# Patient Record
Sex: FEMALE | Race: WHITE | NOT HISPANIC OR LATINO
[De-identification: names, ages, dates, MRNs, and addresses within clinical notes are randomized per-mention and may not be internally consistent; named-entity substitution may affect disease eponyms.]

---

## 2018-07-27 ENCOUNTER — APPOINTMENT (OUTPATIENT)
Dept: PULMONOLOGY | Facility: CLINIC | Age: 83
End: 2018-07-27
Payer: MEDICARE

## 2018-07-27 VITALS
OXYGEN SATURATION: 92 % | HEART RATE: 73 BPM | DIASTOLIC BLOOD PRESSURE: 70 MMHG | WEIGHT: 156 LBS | SYSTOLIC BLOOD PRESSURE: 130 MMHG | HEIGHT: 60.8 IN | RESPIRATION RATE: 17 BRPM | BODY MASS INDEX: 29.84 KG/M2

## 2018-07-27 DIAGNOSIS — Z87.891 PERSONAL HISTORY OF NICOTINE DEPENDENCE: ICD-10-CM

## 2018-07-27 PROCEDURE — 94060 EVALUATION OF WHEEZING: CPT | Mod: 59

## 2018-07-27 PROCEDURE — 94729 DIFFUSING CAPACITY: CPT

## 2018-07-27 PROCEDURE — 94618 PULMONARY STRESS TESTING: CPT

## 2018-07-27 PROCEDURE — 94727 GAS DIL/WSHOT DETER LNG VOL: CPT

## 2018-07-27 PROCEDURE — 99204 OFFICE O/P NEW MOD 45 MIN: CPT | Mod: 25

## 2018-07-29 ENCOUNTER — TRANSCRIPTION ENCOUNTER (OUTPATIENT)
Age: 83
End: 2018-07-29

## 2018-10-01 ENCOUNTER — APPOINTMENT (OUTPATIENT)
Dept: PULMONOLOGY | Facility: CLINIC | Age: 83
End: 2018-10-01
Payer: MEDICARE

## 2018-10-01 ENCOUNTER — NON-APPOINTMENT (OUTPATIENT)
Age: 83
End: 2018-10-01

## 2018-10-01 VITALS
SYSTOLIC BLOOD PRESSURE: 122 MMHG | BODY MASS INDEX: 28.71 KG/M2 | OXYGEN SATURATION: 95 % | HEIGHT: 62 IN | RESPIRATION RATE: 17 BRPM | HEART RATE: 67 BPM | DIASTOLIC BLOOD PRESSURE: 70 MMHG | WEIGHT: 156 LBS

## 2018-10-01 PROCEDURE — 99214 OFFICE O/P EST MOD 30 MIN: CPT | Mod: 25

## 2018-10-01 PROCEDURE — 94010 BREATHING CAPACITY TEST: CPT

## 2018-10-01 NOTE — PHYSICAL EXAM
[General Appearance - Well Developed] : well developed [Normal Appearance] : normal appearance [Well Groomed] : well groomed [General Appearance - Well Nourished] : well nourished [No Deformities] : no deformities [General Appearance - In No Acute Distress] : no acute distress [Normal Conjunctiva] : the conjunctiva exhibited no abnormalities [Eyelids - No Xanthelasma] : the eyelids demonstrated no xanthelasmas [Normal Oropharynx] : normal oropharynx [II] : II [Neck Appearance] : the appearance of the neck was normal [Neck Cervical Mass (___cm)] : no neck mass was observed [Jugular Venous Distention Increased] : there was no jugular-venous distention [Thyroid Diffuse Enlargement] : the thyroid was not enlarged [Thyroid Nodule] : there were no palpable thyroid nodules [Heart Rate And Rhythm] : heart rate and rhythm were normal [Heart Sounds] : normal S1 and S2 [Murmurs] : no murmurs present [Respiration, Rhythm And Depth] : normal respiratory rhythm and effort [Exaggerated Use Of Accessory Muscles For Inspiration] : no accessory muscle use [Auscultation Breath Sounds / Voice Sounds] : lungs were clear to auscultation bilaterally [Abdomen Soft] : soft [Abdomen Tenderness] : non-tender [Abdomen Mass (___ Cm)] : no abdominal mass palpated [Abnormal Walk] : normal gait [Gait - Sufficient For Exercise Testing] : the gait was sufficient for exercise testing [Nail Clubbing] : no clubbing of the fingernails [Cyanosis, Localized] : no localized cyanosis [Petechial Hemorrhages (___cm)] : no petechial hemorrhages [Skin Color & Pigmentation] : normal skin color and pigmentation [] : no rash [No Venous Stasis] : no venous stasis [Skin Lesions] : no skin lesions [No Skin Ulcers] : no skin ulcer [No Xanthoma] : no  xanthoma was observed [Deep Tendon Reflexes (DTR)] : deep tendon reflexes were 2+ and symmetric [Sensation] : the sensory exam was normal to light touch and pinprick [No Focal Deficits] : no focal deficits [Oriented To Time, Place, And Person] : oriented to person, place, and time [Impaired Insight] : insight and judgment were intact [Affect] : the affect was normal [FreeTextEntry1] : I:E 1:3 clear

## 2018-10-01 NOTE — PROCEDURE
[FreeTextEntry1] : She had a PET/CT scan performed on 5/18/2018, which showed internal development of several hypermetabolic patchy opacities in the right lung, which may be inflammatory in nature since 4/19/2017. Persistent right hilar and thyroid lobe activity. \par \par PFT- spi reveals normal flows; FEV1 was 1.19 L which is 80% of predicted; normal lung volumes; normal diffusion at [], which is % of predicted; normal flow volume loop m

## 2018-10-01 NOTE — HISTORY OF PRESENT ILLNESS
[FreeTextEntry1] : Ms. Borja is a 85 year old female coming into the office today for a follow up visit with a history of Abnormal chest CT, COPD, GERD, hypothyroidism, overweight, poor balance, and snoring. Her chief complaint is blood sugar. \par - She comes in stating that she believes that she had a period of depression during the past month. She also notes that her sugar levels have been elevated by 10-15 \par - She states that her bowels are sometimes softer than she would like\par -  She denies any headaches, nausea, vomiting, fever, chills, sweats, chest pain, chest pressure, palpitations, SOB, coughing, wheezing, fatigue, dysphagia, myalgias, dizziness, leg swelling, leg pain, itchy eyes, itchy ears, heartburn, reflux, or sour taste in the mouth. \par \par

## 2018-10-01 NOTE — REASON FOR VISIT
[Follow-Up] : a follow-up visit [FreeTextEntry1] : Abnormal chest CT, COPD, GERD, hypothyroidism, overweight, poor balance, and snoring

## 2018-10-01 NOTE — ADDENDUM
[FreeTextEntry1] : Documented by Blake Damon acting as a scribe for Dr. Misael Joel on 9/25/18\par \par All medical record entries made by the Scribe were at my, Dr. Misael Joel's, direction and personally dictated by me on 9/25/18. I have reviewed the chart and agree that the record accurately reflects my personal performance of the history, physical exam, assessment and plan. I have also personally directed, reviewed, and agree with the discharge instructions. \par \par \par \par \par

## 2018-10-01 NOTE — ASSESSMENT
[FreeTextEntry1] : Ms. Borja is a 85 year old female with a prior history of  breast CA 1999 lung cancer 2007, overweight, diabetes, hyperthyroidism, tremor, anxiety, COPD, diverticulosis, GERD who now comes in for a pulmonary re- evaluation. Her number one issue is her blood sugar.\par \par The patient's SOB is felt to be multifactorial:\par - Overweight\par - Out-of Shape\par - Poor breathing mechanics (could be secondary to poor balance or anxiety)\par - Restrictive Lung Disease- Lobectomy with loss of volume and overweight \par -  Obstructive Lung Disease: Asthma/ COPD\par - Cardiac Disease \par \par DDxof Abnormal CT Chest Scan \par - Infection,\par - Inflammation, \par - Less likely cancer \par \par Problem 1: COPD/ Asthma\par - Add Trelegy 1 inhalation QD\par Asthma is believed to be caused by inherited (genetic) and environmental factor, but its exact cause is unknown. Asthma may be triggered by allergens, lung infections, or irritants in the air. Asthma triggers are different for each person \par -COPD is a progressive disease and although it can’t be cured , appropriate management can slow its progression, reduce frequency and severity of exacerbations, and improve symptoms and the patient quality of life. Hospitalizations are the greatest contributor to the total COPD costs and account for up to 87% of total COPD related costs. Exacerbations are the main cause of admissions and subsequently account for the 40-75% of COPD costs. Inhaled maintenance therapy reduces the incidence of exacerbations in patients with stable COPD. Incorrect inhaler use and nonadherence are major obstacles to achieving COPD treatment goals. Many COPD patients have challenges (impaired inhalation, limited dexterity, reduced cognition: that limit their ability to correctly use their COPD treatment devices resulting in reduced symptom control. Of most importance is smoking cessation and early intervention with respiratory illnesses and contemplation for pulmonary rehab to enhance quality of life.\par -Inhaler technique reviewed as well as oral hygiene techniques reviewed with patient. Avoidance of cold air, extremes of temperature, rescue inhaler should be used before exercise. Order of medication reviewed with patient. Recommended use of a cool mist humidifier in the bedroom. \par \par Problem 2: GERD \par - Continue omeprazole 20 mg before breakfat\par Things to avoid including overeating, spicy foods, tight clothing, eating within three hours of bed, this list is not all inclusive. \par -For treatment of reflux, possible options discussed including diet control, H2 blockers, PPIs, as well as coating motility agents discussed as treatment options. Timing of meals and proximity of last meal to sleep were discussed. If symptoms persist, a formal gastrointestinal evaluation is needed.\par \par Problem 3: Abnormal CT Chest \par - She is s/p a Quantiferon Gold test, which was negative. \par - Order a repeat regular CT scan of the chest (now)- if there are any changes with biopsy. \par \par Problem 4: poor balance\par - Recommended balance therapy (Gait Training)\par \par Problem 5: ? OSAS\par - Due to her snoring, elevated Mallampati class, and EDS, she is being recommended for a home sleep study to discern for sleep apnea \par Sleep apnea is associated with adverse clinical consequences which an affect most organ systems. Cardiovascular disease risk includes arrhythmias, atrial fibrillation, hypertension, coronary artery disease, and stroke. Metabolic disorders include diabetes type 2, non-alcoholic fatty liver disease. Mood disorder especially depression; and cognitive decline especially in the elderly. Associations with chronic reflux/Sullivan’s esophagus some but not all inclusive. \par -Reasons include arousal consistent with hypopnea; respiratory events most prominent in REM sleep or supine position; therefore sleep staging and body position are important for accurate diagnosis and estimation of AHI. \par \par Problem 6: Overweight \par - Weight loss, exercise, and diet control were discussed and are highly encouraged. Treatment options were given such as, aqua therapy, and contacting a nutritionist. Recommended to use the elliptical, stationary bike, less use of treadmill. Mindful eating was explained to the patient Obesity is associated with worsening asthma, shortness of breath, and potential for cardiac disease, diabetes, and other underlying medical conditions. \par \par Problem 7: Poor breathing\par Proper breathing techniques were reviewed with an emphasis of exhalation. Patient instructed to breath in for 1 second and out for four seconds. Patient was encouraged to not talk while walking. \par \par Problem 8:health maintenance \par -s/p flu shot 2017\par -recommended strep pneumonia vaccines: Prevnar-13 vaccine, followed by Pneumo vaccine 23 one year following\par -recommended early intervention for URIs\par -recommended regular osteoporosis evaluations\par -recommended early dermatological evaluations\par -recommended after the age of 50 to the age of 70, colonoscopy every 5 years \par \par f/u in 3-4 months \par pt is encouraged to call or fax the office with any questions or concerns. \par Explained to the pt in full detail with demonstrations how to use the inhalers and inhaler hygiene. \par -Education provided to the PT regarding their visit and conditions.

## 2018-12-05 ENCOUNTER — NON-APPOINTMENT (OUTPATIENT)
Age: 83
End: 2018-12-05

## 2018-12-05 ENCOUNTER — APPOINTMENT (OUTPATIENT)
Dept: PULMONOLOGY | Facility: CLINIC | Age: 83
End: 2018-12-05
Payer: MEDICARE

## 2018-12-05 VITALS
RESPIRATION RATE: 18 BRPM | BODY MASS INDEX: 27.94 KG/M2 | OXYGEN SATURATION: 92 % | SYSTOLIC BLOOD PRESSURE: 140 MMHG | HEART RATE: 94 BPM | WEIGHT: 148 LBS | HEIGHT: 61 IN | DIASTOLIC BLOOD PRESSURE: 80 MMHG

## 2018-12-05 PROCEDURE — 99214 OFFICE O/P EST MOD 30 MIN: CPT | Mod: 25

## 2018-12-05 PROCEDURE — 94010 BREATHING CAPACITY TEST: CPT

## 2018-12-26 ENCOUNTER — RX RENEWAL (OUTPATIENT)
Age: 83
End: 2018-12-26

## 2019-01-04 ENCOUNTER — RX RENEWAL (OUTPATIENT)
Age: 84
End: 2019-01-04

## 2019-01-17 ENCOUNTER — MEDICATION RENEWAL (OUTPATIENT)
Age: 84
End: 2019-01-17

## 2019-02-04 ENCOUNTER — APPOINTMENT (OUTPATIENT)
Dept: PULMONOLOGY | Facility: CLINIC | Age: 84
End: 2019-02-04
Payer: MEDICARE

## 2019-02-04 VITALS
OXYGEN SATURATION: 16 % | RESPIRATION RATE: 70 BRPM | SYSTOLIC BLOOD PRESSURE: 130 MMHG | WEIGHT: 155 LBS | TEMPERATURE: 94 F | DIASTOLIC BLOOD PRESSURE: 76 MMHG | HEIGHT: 61 IN | BODY MASS INDEX: 29.27 KG/M2

## 2019-02-04 PROCEDURE — 99214 OFFICE O/P EST MOD 30 MIN: CPT

## 2019-02-04 RX ORDER — CIPROFLOXACIN HYDROCHLORIDE 250 MG/1
250 TABLET, FILM COATED ORAL
Qty: 20 | Refills: 0 | Status: DISCONTINUED | COMMUNITY
Start: 2019-01-04 | End: 2019-02-04

## 2019-02-04 NOTE — HISTORY OF PRESENT ILLNESS
[FreeTextEntry1] : Ms. Borja is a 85 year old female coming into the office today for a follow up visit with a history of Abnormal chest CT, COPD, GERD, hypothyroidism, DM, overweight, poor balance, and snoring. Her chief complaint is PND. \par - She is feeling generally pretty well\par - She occasionally has light sweats in the morning. \par - SHe does not feel as strong as she was\par - Her balance is okay. \par - She has itchy eyes and PND\par - She noticed having phlegm in her throat in the morning. She feels that it is related to PND\par - She noted having a productive coughing the evening. \par - She is sleeping relatively well though she notes she could use more. \par - She denies any headaches, nausea, vomiting, fever, chills, chest pain, chest pressure, palpitations, SOB, wheezing, fatigue, diarrhea, constipation, dysphagia, myalgias, dizziness, leg swelling, leg pain, itchy eyes, itchy ears, heartburn, reflux, or sour taste in the mouth.

## 2019-02-04 NOTE — REASON FOR VISIT
[Follow-Up] : a follow-up visit [FreeTextEntry1] : Abnormal chest CT, COPD, GERD, hypothyroidism, DM, overweight, poor balance, and snoring.

## 2019-02-04 NOTE — ASSESSMENT
[FreeTextEntry1] : Ms. Borja is a 85 year old female with a prior history of  breast CA 1999 lung cancer 2007, overweight, diabetes, hyperthyroidism, tremor, anxiety, COPD, diverticulosis, GERD who now comes in for a pulmonary re- evaluation. Her number one issue is her blood sugar, though she is improved overall. \par \par The patient's SOB is felt to be multifactorial:\par - Overweight\par - Out-of Shape\par - Poor breathing mechanics (could be secondary to poor balance or anxiety)\par - Restrictive Lung Disease- Lobectomy with loss of volume and overweight \par -  Obstructive Lung Disease: Asthma/ COPD\par - Cardiac Disease \par \par DDxof Abnormal CT Chest Scan (improved)\par - Inflammation (#1)\par - Infection (#2)\par - Less likely cancer (#3)\par \par Problem 1: COPD/ Asthma\par - Continue Duoneb via nebulizer BID\par - Continue Pulmicort 0.5 mg via nebulizer BID\par - Continue Trelegy 1 inhalation QD\par Asthma is believed to be caused by inherited (genetic) and environmental factor, but its exact cause is unknown. Asthma may be triggered by allergens, lung infections, or irritants in the air. Asthma triggers are different for each person \par -COPD is a progressive disease and although it can’t be cured , appropriate management can slow its progression, reduce frequency and severity of exacerbations, and improve symptoms and the patient quality of life. Hospitalizations are the greatest contributor to the total COPD costs and account for up to 87% of total COPD related costs. Exacerbations are the main cause of admissions and subsequently account for the 40-75% of COPD costs. Inhaled maintenance therapy reduces the incidence of exacerbations in patients with stable COPD. Incorrect inhaler use and nonadherence are major obstacles to achieving COPD treatment goals. Many COPD patients have challenges (impaired inhalation, limited dexterity, reduced cognition: that limit their ability to correctly use their COPD treatment devices resulting in reduced symptom control. Of most importance is smoking cessation and early intervention with respiratory illnesses and contemplation for pulmonary rehab to enhance quality of life.\par -Inhaler technique reviewed as well as oral hygiene techniques reviewed with patient. Avoidance of cold air, extremes of temperature, rescue inhaler should be used before exercise. Order of medication reviewed with patient. Recommended use of a cool mist humidifier in the bedroom. \par \par Problem 2: GERD \par - Continue omeprazole 20 mg before breakfat\par Things to avoid including overeating, spicy foods, tight clothing, eating within three hours of bed, this list is not all inclusive. \par -For treatment of reflux, possible options discussed including diet control, H2 blockers, PPIs, as well as coating motility agents discussed as treatment options. Timing of meals and proximity of last meal to sleep were discussed. If symptoms persist, a formal gastrointestinal evaluation is needed.\par \par Problem 3: Abnormal CT Chest \par - She is s/p a Quantiferon Gold test, which was negative. \par -Follow up CT scan of the chest (next 3/2019)\par \par Problem 4: poor balance\par - Recommended balance therapy (Gait Training)\par \par Problem 5: ? OSAS\par - Due to her snoring, elevated Mallampati class, and EDS, she is being recommended for a home sleep study to discern for sleep apnea \par Sleep apnea is associated with adverse clinical consequences which an affect most organ systems. Cardiovascular disease risk includes arrhythmias, atrial fibrillation, hypertension, coronary artery disease, and stroke. Metabolic disorders include diabetes type 2, non-alcoholic fatty liver disease. Mood disorder especially depression; and cognitive decline especially in the elderly. Associations with chronic reflux/Sullivan’s esophagus some but not all inclusive. \par -Reasons include arousal consistent with hypopnea; respiratory events most prominent in REM sleep or supine position; therefore sleep staging and body position are important for accurate diagnosis and estimation of AHI. \par \par Problem 6: Overweight \par - Weight loss, exercise, and diet control were discussed and are highly encouraged. Treatment options were given such as, aqua therapy, and contacting a nutritionist. Recommended to use the elliptical, stationary bike, less use of treadmill. Mindful eating was explained to the patient Obesity is associated with worsening asthma, shortness of breath, and potential for cardiac disease, diabetes, and other underlying medical conditions. \par \par Problem 7: Poor breathing\par Proper breathing techniques were reviewed with an emphasis of exhalation. Patient instructed to breath in for 1 second and out for four seconds. Patient was encouraged to not talk while walking. \par \par Problem 8:health maintenance \par -s/p flu shot 2018\par -recommended strep pneumonia vaccines: Prevnar-13 vaccine, followed by Pneumo vaccine 23 one year following\par -recommended early intervention for URIs\par -recommended regular osteoporosis evaluations\par -recommended early dermatological evaluations\par -recommended after the age of 50 to the age of 70, colonoscopy every 5 years \par \par f/u in 3-4 months \par pt is encouraged to call or fax the office with any questions or concerns. \par Explained to the pt in full detail with demonstrations how to use the inhalers and inhaler hygiene. \par -Education provided to the PT regarding their visit and conditions.

## 2019-02-04 NOTE — PROCEDURE
[FreeTextEntry1] : She had bloodwork performed on 1/26/2019, which revealed:\par - Glucose 143 \par - Creatine 1.06 \par - HDL 33\par - Triglycerides 158 \par - HGB- A1c 6.6 \par - WBC 7.8 \par - 3.7% eosinophils (289)\par \par She had a CT chest scan performed on 12/4/2018, which showed stable postsurgical changes secondary to prior left upper lobectomy. 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 \par \par She had two culture tests performed on 12/22/2018. Her culture, fungus with smear no hair, skin, blood revealed light growth of candida albicans. Her culture, sputum/ lower respiratory revealed heavy growth of pseudomonas aeruginosa.

## 2019-02-04 NOTE — ADDENDUM
[FreeTextEntry1] : Documented by Blake Damon acting as a scribe for Dr. Misael Joel on 2/4/2019\par \par All medical record entries made by the Scribe were at my, Dr. Misael Joel's, direction and personally dictated by me on 2/4/2019. I have reviewed the chart and agree that the record accurately reflects my personal performance of the history, physical exam, assessment and plan. I have also personally directed, reviewed, and agree with the discharge instructions. \par \par \par \par \par

## 2019-04-15 ENCOUNTER — APPOINTMENT (OUTPATIENT)
Dept: PULMONOLOGY | Facility: CLINIC | Age: 84
End: 2019-04-15

## 2019-05-17 ENCOUNTER — APPOINTMENT (OUTPATIENT)
Dept: PULMONOLOGY | Facility: CLINIC | Age: 84
End: 2019-05-17
Payer: MEDICARE

## 2019-05-17 VITALS
HEART RATE: 65 BPM | DIASTOLIC BLOOD PRESSURE: 65 MMHG | HEIGHT: 61 IN | BODY MASS INDEX: 29.27 KG/M2 | OXYGEN SATURATION: 95 % | WEIGHT: 155 LBS | SYSTOLIC BLOOD PRESSURE: 120 MMHG | RESPIRATION RATE: 16 BRPM

## 2019-05-17 PROCEDURE — 94060 EVALUATION OF WHEEZING: CPT

## 2019-05-17 PROCEDURE — 94729 DIFFUSING CAPACITY: CPT

## 2019-05-17 PROCEDURE — 94727 GAS DIL/WSHOT DETER LNG VOL: CPT

## 2019-05-17 PROCEDURE — 99214 OFFICE O/P EST MOD 30 MIN: CPT | Mod: 25

## 2019-05-17 PROCEDURE — 94618 PULMONARY STRESS TESTING: CPT

## 2019-05-17 RX ORDER — CEFUROXIME AXETIL 250 MG/1
250 TABLET ORAL
Qty: 4 | Refills: 0 | Status: DISCONTINUED | COMMUNITY
Start: 2018-11-26

## 2019-05-17 RX ORDER — BLOOD SUGAR DIAGNOSTIC
STRIP MISCELLANEOUS
Qty: 100 | Refills: 0 | Status: ACTIVE | COMMUNITY
Start: 2018-06-04

## 2019-05-17 NOTE — ASSESSMENT
[FreeTextEntry1] : Ms. Borja is a 85 year old female with a prior history of  breast CA 1999 lung cancer 2007, overweight, diabetes, hyperthyroidism, tremor, anxiety, COPD, diverticulosis, GERD who now comes in for a pulmonary re- evaluation. Her number one issue is her blood sugar, though she is improved overall, however her chest CT has progressed. \par \par The patient's SOB is felt to be multifactorial:\par - Overweight / Out-of Shape\par - Poor breathing mechanics (could be secondary to poor balance or anxiety)\par - Restrictive Lung Disease- Lobectomy with loss of volume and overweight \par -  Obstructive Lung Disease: Asthma/ COPD\par - Cardiac Disease \par \par DDx of Abnormal CT Chest Scan (improved)\par - Inflammation (#1)\par - Infection (#2)\par - Less likely cancer (#3)\par \par Problem 1: Abnormal CT Chest \par - She is s/p a Quantiferon Gold test, which was negative. \par -Follow up CT scan of the chest (next 3/2019) - ?etiology\par >inflammation / infectious vs. Lung cancer \par -get CTS evaluation - Dr. Blair Kline \par -get blood work to include full rheumatologic panel - hypersensitivity panel , ESR level, ACE level \par \par Problem 2: COPD/ Asthma\par - Continue DuoNeb via nebulizer BID\par - Continue Pulmicort 0.5 mg via nebulizer BID\par - Continue Trelegy 1 inhalation QD\par Asthma is believed to be caused by inherited (genetic) and environmental factor, but its exact cause is unknown. Asthma may be triggered by allergens, lung infections, or irritants in the air. Asthma triggers are different for each person \par -COPD is a progressive disease and although it can’t be cured , appropriate management can slow its progression, reduce frequency and severity of exacerbations, and improve symptoms and the patient quality of life. Hospitalizations are the greatest contributor to the total COPD costs and account for up to 87% of total COPD related costs. Exacerbations are the main cause of admissions and subsequently account for the 40-75% of COPD costs. Inhaled maintenance therapy reduces the incidence of exacerbations in patients with stable COPD. Incorrect inhaler use and nonadherence are major obstacles to achieving COPD treatment goals. Many COPD patients have challenges (impaired inhalation, limited dexterity, reduced cognition: that limit their ability to correctly use their COPD treatment devices resulting in reduced symptom control. Of most importance is smoking cessation and early intervention with respiratory illnesses and contemplation for pulmonary rehab to enhance quality of life.\par -Inhaler technique reviewed as well as oral hygiene techniques reviewed with patient. Avoidance of cold air, extremes of temperature, rescue inhaler should be used before exercise. Order of medication reviewed with patient. Recommended use of a cool mist humidifier in the bedroom. \par \par Problem 3: GERD \par - Continue omeprazole 20 mg before breakfat\par Things to avoid including overeating, spicy foods, tight clothing, eating within three hours of bed, this list is not all inclusive. \par -For treatment of reflux, possible options discussed including diet control, H2 blockers, PPIs, as well as coating motility agents discussed as treatment options. Timing of meals and proximity of last meal to sleep were discussed. If symptoms persist, a formal gastrointestinal evaluation is needed.\par \par Problem 4: poor balance\par - Recommended balance therapy (Gait Training)\par \par Problem 5: ? OSAS\par - Due to her snoring, elevated Mallampati class, and EDS, she is being recommended for a home sleep study to discern for sleep apnea \par Sleep apnea is associated with adverse clinical consequences which an affect most organ systems. Cardiovascular disease risk includes arrhythmias, atrial fibrillation, hypertension, coronary artery disease, and stroke. Metabolic disorders include diabetes type 2, non-alcoholic fatty liver disease. Mood disorder especially depression; and cognitive decline especially in the elderly. Associations with chronic reflux/Sullivan’s esophagus some but not all inclusive. \par -Reasons include arousal consistent with hypopnea; respiratory events most prominent in REM sleep or supine position; therefore sleep staging and body position are important for accurate diagnosis and estimation of AHI. \par \par Problem 6: Overweight \par - Weight loss, exercise, and diet control were discussed and are highly encouraged. Treatment options were given such as, aqua therapy, and contacting a nutritionist. Recommended to use the elliptical, stationary bike, less use of treadmill. Mindful eating was explained to the patient Obesity is associated with worsening asthma, shortness of breath, and potential for cardiac disease, diabetes, and other underlying medical conditions. \par \par Problem 7: Poor breathing\par Proper breathing techniques were reviewed with an emphasis of exhalation. Patient instructed to breath in for 1 second and out for four seconds. Patient was encouraged to not talk while walking. \par \par problem 8: preoperative pulmonary clearance\par -at this point in time there are no absolute pulmonary contraindications to go forward with the planned procedure \par -at the time of surgery s/he should have optimal pain control, incentive spirometry, early ambulation, DVT and GI prophylaxis. \par \par Problem 9:health maintenance \par -PCP: Dr. Junior Frias\par -s/p flu shot 2018\par -recommended strep pneumonia vaccines: Prevnar-13 vaccine, followed by Pneumo vaccine 23 one year following\par -recommended early intervention for URIs\par -recommended regular osteoporosis evaluations\par -recommended early dermatological evaluations\par -recommended after the age of 50 to the age of 70, colonoscopy every 5 years \par \par f/u in 3-4 months \par pt is encouraged to call or fax the office with any questions or concerns. \par Explained to the pt in full detail with demonstrations how to use the inhalers and inhaler hygiene. \par -Education provided to the PT regarding their visit and conditions.

## 2019-05-17 NOTE — HISTORY OF PRESENT ILLNESS
[FreeTextEntry1] : Ms. Borja is a 85 year old female with a history of abnormal chest CT,(progressive) COPD, prior lung cancer, OW, poor balance, and snoring presenting to the office today for a follow up visit. Her chief complaint is abnormal chest CT. \par -She states that she has generally been feeling well \par -she reports that she occasionally has chills/sweats in the morning\par -she reports occasional diarrhea / constipation\par -she states that her throat will become sore after using her nebulizer\par -she denies any headaches, nausea, vomiting, fever, chest pain, chest pressure,  dysphagia, dizziness, leg swelling, leg pain, itchy eyes, itchy ears, heartburn, reflux, or sour taste in the mouth.

## 2019-05-17 NOTE — PROCEDURE
[FreeTextEntry1] : Chest CT (December 4, 2019) reveals multiple scattered linear and nodular opacities are present b/l, favored to represent a combination of centrilobular nodules and mucous plugs. opacities in the Right parahilar region, posterior RLL, and anterolateral RML and the medial RLL have decreased in density. nodularity in the right costophrenic sulcus has nearly resolved. scattered additional tiny nodules are also stable or have slightly decreased in size. the renaming central tracheobronchial tree is patent.\par \par 6 minute walk test reveals a low saturation of 90% with no evidence of dyspnea or fatigue; walked 407.5 meters  \par \par PFT - spi reveals normal flows; FEV1 is 1.27 which is 85% of predicted, normal flow volume loop. Normal LV, moderately reduced diffusion, DLCO is 9.2, which is 56% of predicted. \par \par Blood work (4/25/19) \par WBC 6.7\par Hgb 12.7\par HCT 40.7\par \par 2% eos\par HgbA1c 6.2\par HDL 34\par T3 64

## 2019-05-17 NOTE — PHYSICAL EXAM
[General Appearance - Well Developed] : well developed [Normal Appearance] : normal appearance [Well Groomed] : well groomed [General Appearance - Well Nourished] : well nourished [No Deformities] : no deformities [General Appearance - In No Acute Distress] : no acute distress [Normal Conjunctiva] : the conjunctiva exhibited no abnormalities [Eyelids - No Xanthelasma] : the eyelids demonstrated no xanthelasmas [Normal Oropharynx] : normal oropharynx [Neck Appearance] : the appearance of the neck was normal [Neck Cervical Mass (___cm)] : no neck mass was observed [Jugular Venous Distention Increased] : there was no jugular-venous distention [Thyroid Diffuse Enlargement] : the thyroid was not enlarged [Thyroid Nodule] : there were no palpable thyroid nodules [Heart Rate And Rhythm] : heart rate and rhythm were normal [Murmurs] : no murmurs present [Heart Sounds] : normal S1 and S2 [Respiration, Rhythm And Depth] : normal respiratory rhythm and effort [Exaggerated Use Of Accessory Muscles For Inspiration] : no accessory muscle use [Abdomen Soft] : soft [Abdomen Tenderness] : non-tender [Abdomen Mass (___ Cm)] : no abdominal mass palpated [Gait - Sufficient For Exercise Testing] : the gait was sufficient for exercise testing [Abnormal Walk] : normal gait [Nail Clubbing] : no clubbing of the fingernails [Cyanosis, Localized] : no localized cyanosis [Petechial Hemorrhages (___cm)] : no petechial hemorrhages [Skin Color & Pigmentation] : normal skin color and pigmentation [No Venous Stasis] : no venous stasis [] : no rash [Skin Lesions] : no skin lesions [No Skin Ulcers] : no skin ulcer [No Xanthoma] : no  xanthoma was observed [Deep Tendon Reflexes (DTR)] : deep tendon reflexes were 2+ and symmetric [Sensation] : the sensory exam was normal to light touch and pinprick [No Focal Deficits] : no focal deficits [Oriented To Time, Place, And Person] : oriented to person, place, and time [Impaired Insight] : insight and judgment were intact [Affect] : the affect was normal [III] : III [FreeTextEntry1] : I:E ratio 1:3;  mild expiratory wheeze

## 2019-05-17 NOTE — REASON FOR VISIT
[Follow-Up] : a follow-up visit [FreeTextEntry1] : abnormal chest CT, COPD, OW, poor balance, and snoring

## 2019-05-17 NOTE — ADDENDUM
[FreeTextEntry1] : All medical record entries made by irasema Garcia were at Dr. Misael Joel's, direction and personally dictated by me on 05/17/2019. I have reviewed the chart and agree that the record accurately reflects my personal performance of the history, physical exam, assessment and plan. I have also personally directed, reviewed, and agree with the discharge instructions.

## 2019-05-21 LAB — ERYTHROCYTE [SEDIMENTATION RATE] IN BLOOD BY WESTERGREN METHOD: 22 MM/HR

## 2019-05-22 ENCOUNTER — APPOINTMENT (OUTPATIENT)
Dept: THORACIC SURGERY | Facility: CLINIC | Age: 84
End: 2019-05-22
Payer: MEDICARE

## 2019-05-22 VITALS
OXYGEN SATURATION: 96 % | TEMPERATURE: 96 F | BODY MASS INDEX: 29.07 KG/M2 | WEIGHT: 154 LBS | DIASTOLIC BLOOD PRESSURE: 80 MMHG | RESPIRATION RATE: 16 BRPM | SYSTOLIC BLOOD PRESSURE: 170 MMHG | HEIGHT: 61 IN | HEART RATE: 66 BPM

## 2019-05-22 LAB
ACE BLD-CCNC: 38 U/L
ALTERN TENCAPG(M6): 3.9 MCG/ML
ASPER FUMCAPG(M3): 31.5 MCG/ML
AUREOBASCAPG(M12): 3.2 MCG/ML
MICROPOLYCAPG(M22): 3.4 MCG/ML
PENIC CHRYCAPG(M1): 23.7 MCG/ML
PHOMA BETAE IGG: 7.7 MCG/ML
THERMOCAPG(M23): 7.9 MCG/ML
TRICHODERMA VIRIDE IGG: 8 MCG/ML

## 2019-05-22 PROCEDURE — 99205 OFFICE O/P NEW HI 60 MIN: CPT

## 2019-05-23 NOTE — PHYSICAL EXAM
[General Appearance - Alert] : alert [General Appearance - In No Acute Distress] : in no acute distress [General Appearance - Well Nourished] : well nourished [General Appearance - Well Developed] : well developed [Sclera] : the sclera and conjunctiva were normal [Outer Ear] : the ears and nose were normal in appearance [] : the neck was supple [Neck Cervical Mass (___cm)] : no neck mass was observed [Respiration, Rhythm And Depth] : normal respiratory rhythm and effort [Auscultation Breath Sounds / Voice Sounds] : lungs were clear to auscultation bilaterally [Heart Rate And Rhythm] : heart rate was normal and rhythm regular [Heart Sounds] : normal S1 and S2 [Abdomen Soft] : soft [Abdomen Tenderness] : non-tender [Cervical Lymph Nodes Enlarged Posterior Bilaterally] : posterior cervical [Cervical Lymph Nodes Enlarged Anterior Bilaterally] : anterior cervical [Supraclavicular Lymph Nodes Enlarged Bilaterally] : supraclavicular [No CVA Tenderness] : no ~M costovertebral angle tenderness [Abnormal Walk] : normal gait [Skin Color & Pigmentation] : normal skin color and pigmentation [No Focal Deficits] : no focal deficits [Oriented To Time, Place, And Person] : oriented to person, place, and time [Impaired Insight] : insight and judgment were intact [Affect] : the affect was normal [FreeTextEntry1] : deferred

## 2019-05-23 NOTE — CONSULT LETTER
[Dear  ___] : Dear  [unfilled], [Consult Letter:] : I had the pleasure of evaluating your patient, [unfilled]. [Please see my note below.] : Please see my note below. [Consult Closing:] : Thank you very much for allowing me to participate in the care of this patient.  If you have any questions, please do not hesitate to contact me. [Sincerely,] : Sincerely, [FreeTextEntry2] : Dr. Misael Joel (Pulm/Ref)\par Dr. Junior Frias (PCP) [FreeTextEntry3] : \par \par \par Huber Kline MD, FACS \par , Division of Thoracic Surgery \par [facility] \par Chief, Thoracic Surgery \par [facility] \par Department of Cardiovascular & Thoracic Surgery \par  \par [facility] [employer] [facility]

## 2019-05-23 NOTE — ASSESSMENT
[FreeTextEntry1] : 86 y/o female who presents today for initial consultation for evaluation of lung nodules.  She was referred by her pulmonologist Dr. Misael Joel.  \par \par She has history of previous lung cancer s/p Left upper lobectomy by Dr. Oquendo in 2007.  She is unsure of the type of cancer, but reports that she did not require any chemotherapy or RT.  She also had previous mediastinoscopy done by Dr. Oquendo on 9/14/11 which revealed benign lymph nodes showing non-necrotizing granulomatous inflammation.  AFB Culture was negative. \par \par 5/14/19 Chest CT Scan revealed:\par Since the prior exam on 12/4/18 there are multiple new dense irregular and somewhat spiculated bilateral opacities.  The largest areas are:\par - 1.0 x 0.5 cm in the medial LLL (series 5 image 200)\par - 2.2 x 1.9 cm posterior RLL (series 5 image 180)\par - 2.3 x 2.0 cm RLL adjacent to the diaphragm (series 5 image 280)\par - 1.4 x 0.9 cm lateral RLL (series 5 image 213)\par - 1.1 x 0.8 cm in the medial RLL (series 5 image 241)\par There are several scattered focal areas of mucoid impaction in the right lung, most of which are essentially stable.  Stable postsurgical changes from LULobectomy.  No evidence of interstitial disease.  Central endobronchial tree is patent.  No axillary, hilar or mediastinal lymphadenopathy or mass.\par \par 5/18/18 PET/CT revealed: Thoracic metabolism fails to discrete supraclavicular or axillary focal activity.  The adolfo an dmediastinum again demonstrate low level activity with a decline in the right hilar max SUV from 2.8 down to 2.5.  The lungs reveal several new focal intense areas of increased activity including the right apex max SUV 1.2 measuring 6 mm, right upper lobe mid lung zone central focus max SUV 5.5 measuring approximately 1.2 cm, RML anterolateral max SUV 5.4 measuring approximately 2.2 cm and RLL lateral focus with max SUV 3.4 measuring 1.4 cm.\par \par 5/17/19 PFT's:\par FVC- 1.91 (89%)\par FEV1- 1.27 (85%)\par DLCO- 9.2 (56%)\par \par I have reviewed the patient's medical records and diagnostic images during the time of this office visit, and I have made the following recommendation:\par Schedule for Right VATS, wedge resection as a diagnostic biopsy of one of the several pulmonary lesions.  The risks, benefits, and alternatives to the procedure were discussed with the patient at length. She verbalized understanding, and are in agreement with the above treatment plan.  Prior to surgery she will require medical clearance.\par \par Written by Eunice Johansen NP, acting as a scribe for Leah Weber MD.\par \par The documentation recorded by the scribe accurately reflects the service I personally performed and the decisions made by me. LEAH WEBER MD\par

## 2019-05-23 NOTE — HISTORY OF PRESENT ILLNESS
[FreeTextEntry1] : Ms. JEFFREY LOZANO is a 85 year old female who presents today for initial consultation for evaluation of lung nodules.  She was referred by her pulmonologist Dr. Misael Joel.  \par \par She has history of previous lung cancer s/p Left upper lobectomy by Dr. Oquendo in 2007.  She is unsure of the type of cancer, but reports that she did not require any chemotherapy or RT.  She also had previous mediastinoscopy done by Dr. Oquendo on 9/14/11 which revealed benign lymph nodes showing non-necrotizing granulomatous inflammation.  AFB Culture was negative. \par \par 5/14/19 Chest CT Scan revealed:\par Since the prior exam on 12/4/18 there are multiple new dense irregular and somewhat spiculated bilateral opacities.  The largest areas are:\par - 1.0 x 0.5 cm in the medial LLL (series 5 image 200)\par - 2.2 x 1.9 cm posterior RLL (series 5 image 180)\par - 2.3 x 2.0 cm RLL adjacent to the diaphragm (series 5 image 280)\par - 1.4 x 0.9 cm lateral RLL (series 5 image 213)\par - 1.1 x 0.8 cm in the medial RLL (series 5 image 241)\par There are several scattered focal areas of mucoid impaction in the right lung, most of which are essentially stable.  Stable postsurgical changes from LULobectomy.  No evidence of interstitial disease.  Central endobronchial tree is patent.  No axillary, hilar or mediastinal lymphadenopathy or mass.\par \par 5/18/18 PET/CT revealed: Thoracic metabolism fails to discrete supraclavicular or axillary focal activity.  The adolfo an dmediastinum again demonstrate low level activity with a decline in the right hilar max SUV from 2.8 down to 2.5.  The lungs reveal several new focal intense areas of increased activity including the right apex max SUV 1.2 measuring 6 mm, right upper lobe mid lung zone central focus max SUV 5.5 measuring approximately 1.2 cm, RML anterolateral max SUV 5.4 measuring approximately 2.2 cm and RLL lateral focus with max SUV 3.4 measuring 1.4 cm.\par \par 5/17/19 PFT's:\par FVC- 1.91 (89%)\par FEV1- 1.27 (85%)\par DLCO- 9.2 (56%)\par \par She is a former smoker (50 PYhx, Quit 2009) and her PMHx includes DM, COPD, GERD, breast cancer dx'd 1000, s/p Right lumpectomy and RT, was on Tamoxifen x approx 6 months, stopped after she developed a PE.  \par \par She reports history of cough, but reports that it has improved recently.  She was last hospitalized in November 2018 at [facility] due to an URI.  She admits to SOB with exertion and to occasional sweats in the AM.  She denies any fever, chills, chest pain, hemoptysis, or recent illness.

## 2019-05-23 NOTE — REVIEW OF SYSTEMS
[Feeling Tired] : feeling tired [Loss Of Hearing] : hearing loss [SOB on Exertion] : shortness of breath during exertion [Abdominal Pain] : abdominal pain [Constipation] : constipation [Heartburn] : heartburn [Incontinence] : incontinence [Arthralgias] : arthralgias [Suicidal] : suicidal [Anxiety] : anxiety [Depression] : depression [Negative] : Heme/Lymph [Fever] : no fever [Chills] : no chills [Feeling Poorly] : not feeling poorly [Recent Weight Gain (___ Lbs)] : no recent weight gain [Recent Weight Loss (___ Lbs)] : no recent weight loss [Nosebleeds] : no nosebleeds [Nasal Discharge] : no nasal discharge [Sore Throat] : no sore throat [Hoarseness] : no hoarseness [Shortness Of Breath] : no shortness of breath [Wheezing] : no wheezing [Cough] : no cough [Orthopnea] : no orthopnea [PND] : no PND [Melena] : no melena [Dysuria] : no dysuria [Pelvic Pain] : no pelvic pain [Dysmenorrhea] : no dysmenorrhea [Vaginal Discharge] : no vaginal discharge [Abn Vaginal Bleeding] : no unexplained vaginal bleeding [FreeTextEntry2] : + sweats in the AM- intermittently  [FreeTextEntry7] : abdominal cramping recently [FreeTextEntry8] : + stress incontinence [FreeTextEntry9] : neck pain, back pain occasionally [de-identified] : hx of hypothyroidism

## 2019-05-29 ENCOUNTER — RX RENEWAL (OUTPATIENT)
Age: 84
End: 2019-05-29

## 2019-05-30 ENCOUNTER — MEDICATION RENEWAL (OUTPATIENT)
Age: 84
End: 2019-05-30

## 2019-06-11 ENCOUNTER — APPOINTMENT (OUTPATIENT)
Dept: PULMONOLOGY | Facility: CLINIC | Age: 84
End: 2019-06-11

## 2019-06-11 ENCOUNTER — OUTPATIENT (OUTPATIENT)
Dept: OUTPATIENT SERVICES | Facility: HOSPITAL | Age: 84
LOS: 1 days | End: 2019-06-11

## 2019-06-11 VITALS
HEART RATE: 65 BPM | TEMPERATURE: 98 F | RESPIRATION RATE: 16 BRPM | SYSTOLIC BLOOD PRESSURE: 132 MMHG | DIASTOLIC BLOOD PRESSURE: 70 MMHG | OXYGEN SATURATION: 96 % | WEIGHT: 154.98 LBS | HEIGHT: 60.25 IN

## 2019-06-11 DIAGNOSIS — R91.1 SOLITARY PULMONARY NODULE: ICD-10-CM

## 2019-06-11 DIAGNOSIS — F41.9 ANXIETY DISORDER, UNSPECIFIED: ICD-10-CM

## 2019-06-11 DIAGNOSIS — Z90.710 ACQUIRED ABSENCE OF BOTH CERVIX AND UTERUS: Chronic | ICD-10-CM

## 2019-06-11 DIAGNOSIS — I10 ESSENTIAL (PRIMARY) HYPERTENSION: ICD-10-CM

## 2019-06-11 DIAGNOSIS — E11.9 TYPE 2 DIABETES MELLITUS WITHOUT COMPLICATIONS: ICD-10-CM

## 2019-06-11 DIAGNOSIS — Z98.890 OTHER SPECIFIED POSTPROCEDURAL STATES: Chronic | ICD-10-CM

## 2019-06-11 DIAGNOSIS — K21.9 GASTRO-ESOPHAGEAL REFLUX DISEASE WITHOUT ESOPHAGITIS: ICD-10-CM

## 2019-06-11 DIAGNOSIS — Z98.49 CATARACT EXTRACTION STATUS, UNSPECIFIED EYE: Chronic | ICD-10-CM

## 2019-06-11 LAB
BLD GP AB SCN SERPL QL: NEGATIVE — SIGNIFICANT CHANGE UP
HBA1C BLD-MCNC: 6.5 % — HIGH (ref 4–5.6)
RH IG SCN BLD-IMP: POSITIVE — SIGNIFICANT CHANGE UP

## 2019-06-11 RX ORDER — SODIUM CHLORIDE 9 MG/ML
1000 INJECTION, SOLUTION INTRAVENOUS
Refills: 0 | Status: DISCONTINUED | OUTPATIENT
Start: 2019-06-17 | End: 2019-06-18

## 2019-06-11 NOTE — H&P PST ADULT - NSICDXPASTMEDICALHX_GEN_ALL_CORE_FT
PAST MEDICAL HISTORY:  Acid Reflux     Anxiety and depression     Anxiety Associated with Depression     Benign Essential Tremor     Diabetes Mellitus II     Dyslipidemia     Hx of Breast Cancer 1999 - right - s/p lumpectomy & radiation    Hypothyroidism     Lung Cancer 2007s/p left lobectomy    Osteoporosis     PE (Pulmonary Embolism) 2000    Uterine Leiomyoma

## 2019-06-11 NOTE — H&P PST ADULT - ENDOCRINE COMMENTS
DM Type 2 fasting -175/ on med - hypothyroidism - no recent change with medication - thyroid nodule monitor by endocrinologist

## 2019-06-11 NOTE — H&P PST ADULT - NSICDXPROBLEM_GEN_ALL_CORE_FT
PROBLEM DIAGNOSES  Problem: Solitary pulmonary nodule  Assessment and Plan: Scheduled for right video assisted thoracoscopy, wedge resection on 6/17/2019. labs done and results pending. Preop instruction given and explained. Chlorhexidine antiseptic solution with written and verbal instruction given, verbalized understanding.  Famotidine provided with instruction. Patient verbalized understanding.     Problem: GERD (gastroesophageal reflux disease)  Assessment and Plan: Instructed to take Nexium AM of surgery with a sip of water.     Problem: Hypertension  Assessment and Plan: Instructed to take propranolol AM of surgery with a sip of water.  EKG done by PCP, in chart.  Medical evaluation requested by surgeon, in chart    Problem: Anxiety  Assessment and Plan: Instructed to take Paxil AM of surgery with a sip of water.     Problem: Diabetes mellitus  Assessment and Plan: HbgA1C sent, pending result. Blood glucose - Accuchek ordered stat on admit.   Instructed to hold Januvia and glipizide AM of surgery. Verbalized understanding.   OR booking was notified pt's DM status via fax. PROBLEM DIAGNOSES  Problem: Solitary pulmonary nodule  Assessment and Plan: Scheduled for right video assisted thoracoscopy, wedge resection on 6/17/2019. labs done and results pending. Preop instruction given and explained. Chlorhexidine antiseptic solution with written and verbal instruction given, verbalized understanding.  Famotidine provided with instruction. Patient verbalized understanding.     Problem: GERD (gastroesophageal reflux disease)  Assessment and Plan: Instructed to take Nexium AM of surgery with a sip of water.     Problem: Hypertension  Assessment and Plan: Instructed to take propranolol AM of surgery with a sip of water.  EKG done by PCP, in chart.  Medical evaluation requested by surgeon, in chart  intermediate risk for sleep apnea identified by [employer]ANG survey. OR booking notified via fax.     Problem: Anxiety  Assessment and Plan: Instructed to take Paxil AM of surgery with a sip of water.     Problem: Diabetes mellitus  Assessment and Plan: HbgA1C sent, pending result. Blood glucose - Accuchek ordered stat on admit.   Instructed to hold Januvia and glipizide AM of surgery. Verbalized understanding.   OR booking was notified pt's DM status via fax.

## 2019-06-11 NOTE — H&P PST ADULT - RS GEN PE MLT RESP DETAILS PC
respirations non-labored/clear to auscultation bilaterally/diminished breath sounds, L clear to auscultation bilaterally/no wheezes/no rhonchi/no rales

## 2019-06-11 NOTE — H&P PST ADULT - VENOUS THROMBOEMBOLISM CURRENT STATUS
(1) other risk factor (includes escalating BMI, pack-years of smoking, diabetes requiring insulin, chemotherapy, female gender and length of surgery) (1) varicose veins/(2) malignancy (present or previous)/(1) other risk factor (includes escalating BMI, pack-years of smoking, diabetes requiring insulin, chemotherapy, female gender and length of surgery)

## 2019-06-11 NOTE — H&P PST ADULT - GASTROINTESTINAL DETAILS
no guarding/no organomegaly/no rebound tenderness/no rigidity/nontender/soft bowel sounds normal/soft/nontender

## 2019-06-11 NOTE — H&P PST ADULT - MUSCULOSKELETAL
detailed exam no joint swelling/ROM intact details… no calf tenderness/no joint swelling/normal strength

## 2019-06-11 NOTE — H&P PST ADULT - NSICDXPASTSURGICALHX_GEN_ALL_CORE_FT
PAST SURGICAL HISTORY:  H/O abdominoplasty > 20 years ago    History of Partial Thyroidectomy > 25 yrs ago    S/P Breast Lumpectomy right - 1999    S/P cataract surgery bilateral 2018    S/P hysterectomy >5 years ago    S/P Laparoscopic Cholecystectomy > 20 years ago    S/P Partial Lobectomy of Lung left - 2007

## 2019-06-11 NOTE — H&P PST ADULT - LYMPHATIC
supraclavicular L/supraclavicular R/posterior cervical R/anterior cervical R/posterior cervical L/anterior cervical L No palpable cervical and supraclavicular lymphadenopathy

## 2019-06-11 NOTE — H&P PST ADULT - NEUROLOGICAL DETAILS
alert and oriented x 3 alert and oriented x 3/responds to pain/responds to verbal commands/normal strength

## 2019-06-11 NOTE — H&P PST ADULT - HISTORY OF PRESENT ILLNESS
84 yo female with hx of DM T2, COPD, GERD, breast cancer s/p right breast lumpectomy & RT (1999), left lung cancer s/p left lobectomy (2007) presents to have PST evaluation with preop dx of solitary pulmonary nodule. Patient reports, had routine CT scan of chest, followed by PET/CT (5/2019) which revealed "abnormality in right lung, was sent for further evaluation, seen by Dr. Kline, now scheduled for right video assisted thoracoscopy, wedge resection on 6/17/2019.

## 2019-06-11 NOTE — H&P PST ADULT - OTHER CARE PROVIDERS
Dr. Misael Joel (pulmonologist) [phone]/ Dr. MANI Cunningham (endocrinologist) [phone]/ Dr. Hart (neurologist) [phone]

## 2019-06-14 ENCOUNTER — CLINICAL ADVICE (OUTPATIENT)
Age: 84
End: 2019-06-14

## 2019-06-16 ENCOUNTER — TRANSCRIPTION ENCOUNTER (OUTPATIENT)
Age: 84
End: 2019-06-16

## 2019-06-17 ENCOUNTER — APPOINTMENT (OUTPATIENT)
Dept: THORACIC SURGERY | Facility: HOSPITAL | Age: 84
End: 2019-06-17

## 2019-06-17 ENCOUNTER — INPATIENT (INPATIENT)
Facility: HOSPITAL | Age: 84
LOS: 0 days | Discharge: HOME | End: 2019-06-18
Attending: THORACIC SURGERY (CARDIOTHORACIC VASCULAR SURGERY) | Admitting: THORACIC SURGERY (CARDIOTHORACIC VASCULAR SURGERY)
Payer: MEDICARE

## 2019-06-17 ENCOUNTER — RESULT REVIEW (OUTPATIENT)
Age: 84
End: 2019-06-17

## 2019-06-17 VITALS
WEIGHT: 154.1 LBS | SYSTOLIC BLOOD PRESSURE: 148 MMHG | TEMPERATURE: 98 F | HEART RATE: 65 BPM | RESPIRATION RATE: 16 BRPM | HEIGHT: 60 IN | OXYGEN SATURATION: 95 % | DIASTOLIC BLOOD PRESSURE: 64 MMHG

## 2019-06-17 DIAGNOSIS — Z90.710 ACQUIRED ABSENCE OF BOTH CERVIX AND UTERUS: Chronic | ICD-10-CM

## 2019-06-17 DIAGNOSIS — R91.1 SOLITARY PULMONARY NODULE: ICD-10-CM

## 2019-06-17 DIAGNOSIS — Z98.890 OTHER SPECIFIED POSTPROCEDURAL STATES: Chronic | ICD-10-CM

## 2019-06-17 DIAGNOSIS — Z98.49 CATARACT EXTRACTION STATUS, UNSPECIFIED EYE: Chronic | ICD-10-CM

## 2019-06-17 DIAGNOSIS — K59.09 OTHER CONSTIPATION: ICD-10-CM

## 2019-06-17 LAB
ANION GAP SERPL CALC-SCNC: 11 MMO/L — SIGNIFICANT CHANGE UP (ref 7–14)
BUN SERPL-MCNC: 25 MG/DL — HIGH (ref 7–23)
CALCIUM SERPL-MCNC: 8.4 MG/DL — SIGNIFICANT CHANGE UP (ref 8.4–10.5)
CHLORIDE SERPL-SCNC: 104 MMOL/L — SIGNIFICANT CHANGE UP (ref 98–107)
CO2 SERPL-SCNC: 24 MMOL/L — SIGNIFICANT CHANGE UP (ref 22–31)
CREAT SERPL-MCNC: 0.87 MG/DL — SIGNIFICANT CHANGE UP (ref 0.5–1.3)
GLUCOSE BLDC GLUCOMTR-MCNC: 102 MG/DL — HIGH (ref 70–99)
GLUCOSE BLDC GLUCOMTR-MCNC: 170 MG/DL — HIGH (ref 70–99)
GLUCOSE BLDC GLUCOMTR-MCNC: 180 MG/DL — HIGH (ref 70–99)
GLUCOSE BLDC GLUCOMTR-MCNC: 187 MG/DL — HIGH (ref 70–99)
GLUCOSE SERPL-MCNC: 142 MG/DL — HIGH (ref 70–99)
GRAM STN WND: SIGNIFICANT CHANGE UP
HCT VFR BLD CALC: 29.9 % — LOW (ref 34.5–45)
HGB BLD-MCNC: 9.3 G/DL — LOW (ref 11.5–15.5)
LACTATE SERPL-SCNC: 1.3 MMOL/L — SIGNIFICANT CHANGE UP (ref 0.5–2)
MAGNESIUM SERPL-MCNC: 1.4 MG/DL — LOW (ref 1.6–2.6)
MCHC RBC-ENTMCNC: 30.1 PG — SIGNIFICANT CHANGE UP (ref 27–34)
MCHC RBC-ENTMCNC: 31.1 % — LOW (ref 32–36)
MCV RBC AUTO: 96.8 FL — SIGNIFICANT CHANGE UP (ref 80–100)
NRBC # FLD: 0 K/UL — SIGNIFICANT CHANGE UP (ref 0–0)
PHOSPHATE SERPL-MCNC: 2.7 MG/DL — SIGNIFICANT CHANGE UP (ref 2.5–4.5)
PLATELET # BLD AUTO: 148 K/UL — LOW (ref 150–400)
PMV BLD: 11.4 FL — SIGNIFICANT CHANGE UP (ref 7–13)
POTASSIUM SERPL-MCNC: 4.5 MMOL/L — SIGNIFICANT CHANGE UP (ref 3.5–5.3)
POTASSIUM SERPL-SCNC: 4.5 MMOL/L — SIGNIFICANT CHANGE UP (ref 3.5–5.3)
RBC # BLD: 3.09 M/UL — LOW (ref 3.8–5.2)
RBC # FLD: 14.9 % — HIGH (ref 10.3–14.5)
RH IG SCN BLD-IMP: POSITIVE — SIGNIFICANT CHANGE UP
SODIUM SERPL-SCNC: 139 MMOL/L — SIGNIFICANT CHANGE UP (ref 135–145)
SPECIMEN SOURCE: SIGNIFICANT CHANGE UP
WBC # BLD: 5.66 K/UL — SIGNIFICANT CHANGE UP (ref 3.8–10.5)
WBC # FLD AUTO: 5.66 K/UL — SIGNIFICANT CHANGE UP (ref 3.8–10.5)

## 2019-06-17 PROCEDURE — 88312 SPECIAL STAINS GROUP 1: CPT | Mod: 26

## 2019-06-17 PROCEDURE — 88342 IMHCHEM/IMCYTCHM 1ST ANTB: CPT | Mod: 26

## 2019-06-17 PROCEDURE — 71045 X-RAY EXAM CHEST 1 VIEW: CPT | Mod: 26

## 2019-06-17 PROCEDURE — 88307 TISSUE EXAM BY PATHOLOGIST: CPT | Mod: 26

## 2019-06-17 PROCEDURE — 99291 CRITICAL CARE FIRST HOUR: CPT

## 2019-06-17 PROCEDURE — 88313 SPECIAL STAINS GROUP 2: CPT | Mod: 26

## 2019-06-17 PROCEDURE — 31622 DX BRONCHOSCOPE/WASH: CPT

## 2019-06-17 PROCEDURE — 88341 IMHCHEM/IMCYTCHM EA ADD ANTB: CPT | Mod: 26

## 2019-06-17 PROCEDURE — 32666 THORACOSCOPY W/WEDGE RESECT: CPT

## 2019-06-17 RX ORDER — ATORVASTATIN CALCIUM 80 MG/1
1 TABLET, FILM COATED ORAL
Qty: 0 | Refills: 0 | DISCHARGE

## 2019-06-17 RX ORDER — ATORVASTATIN CALCIUM 80 MG/1
10 TABLET, FILM COATED ORAL AT BEDTIME
Refills: 0 | Status: DISCONTINUED | OUTPATIENT
Start: 2019-06-17 | End: 2019-06-18

## 2019-06-17 RX ORDER — BUDESONIDE, MICRONIZED 100 %
0.5 POWDER (GRAM) MISCELLANEOUS
Refills: 0 | Status: DISCONTINUED | OUTPATIENT
Start: 2019-06-17 | End: 2019-06-18

## 2019-06-17 RX ORDER — TIOTROPIUM BROMIDE 18 UG/1
1 CAPSULE ORAL; RESPIRATORY (INHALATION) DAILY
Refills: 0 | Status: DISCONTINUED | OUTPATIENT
Start: 2019-06-17 | End: 2019-06-18

## 2019-06-17 RX ORDER — NALOXONE HYDROCHLORIDE 4 MG/.1ML
0.1 SPRAY NASAL
Refills: 0 | Status: DISCONTINUED | OUTPATIENT
Start: 2019-06-17 | End: 2019-06-18

## 2019-06-17 RX ORDER — SODIUM CHLORIDE 9 MG/ML
1000 INJECTION, SOLUTION INTRAVENOUS
Refills: 0 | Status: DISCONTINUED | OUTPATIENT
Start: 2019-06-17 | End: 2019-06-18

## 2019-06-17 RX ORDER — INSULIN LISPRO 100/ML
VIAL (ML) SUBCUTANEOUS
Refills: 0 | Status: DISCONTINUED | OUTPATIENT
Start: 2019-06-17 | End: 2019-06-18

## 2019-06-17 RX ORDER — LEVOTHYROXINE SODIUM 125 MCG
1 TABLET ORAL
Qty: 0 | Refills: 0 | DISCHARGE

## 2019-06-17 RX ORDER — ESOMEPRAZOLE MAGNESIUM 40 MG/1
1 CAPSULE, DELAYED RELEASE ORAL
Qty: 0 | Refills: 0 | DISCHARGE

## 2019-06-17 RX ORDER — LEVOTHYROXINE SODIUM 125 MCG
88 TABLET ORAL DAILY
Refills: 0 | Status: DISCONTINUED | OUTPATIENT
Start: 2019-06-17 | End: 2019-06-18

## 2019-06-17 RX ORDER — GLUCAGON INJECTION, SOLUTION 0.5 MG/.1ML
1 INJECTION, SOLUTION SUBCUTANEOUS ONCE
Refills: 0 | Status: DISCONTINUED | OUTPATIENT
Start: 2019-06-17 | End: 2019-06-18

## 2019-06-17 RX ORDER — DEXTROSE 50 % IN WATER 50 %
25 SYRINGE (ML) INTRAVENOUS ONCE
Refills: 0 | Status: DISCONTINUED | OUTPATIENT
Start: 2019-06-17 | End: 2019-06-18

## 2019-06-17 RX ORDER — IPRATROPIUM/ALBUTEROL SULFATE 18-103MCG
3 AEROSOL WITH ADAPTER (GRAM) INHALATION
Qty: 0 | Refills: 0 | DISCHARGE

## 2019-06-17 RX ORDER — CLONAZEPAM 1 MG
0.5 TABLET ORAL DAILY
Refills: 0 | Status: DISCONTINUED | OUTPATIENT
Start: 2019-06-17 | End: 2019-06-18

## 2019-06-17 RX ORDER — DEXTROSE 50 % IN WATER 50 %
15 SYRINGE (ML) INTRAVENOUS ONCE
Refills: 0 | Status: DISCONTINUED | OUTPATIENT
Start: 2019-06-17 | End: 2019-06-18

## 2019-06-17 RX ORDER — SENNA PLUS 8.6 MG/1
2 TABLET ORAL AT BEDTIME
Refills: 0 | Status: DISCONTINUED | OUTPATIENT
Start: 2019-06-17 | End: 2019-06-18

## 2019-06-17 RX ORDER — CLONAZEPAM 1 MG
1 TABLET ORAL
Qty: 0 | Refills: 0 | DISCHARGE

## 2019-06-17 RX ORDER — ALBUMIN HUMAN 25 %
250 VIAL (ML) INTRAVENOUS ONCE
Refills: 0 | Status: COMPLETED | OUTPATIENT
Start: 2019-06-17 | End: 2019-06-17

## 2019-06-17 RX ORDER — HYDROMORPHONE HYDROCHLORIDE 2 MG/ML
0.5 INJECTION INTRAMUSCULAR; INTRAVENOUS; SUBCUTANEOUS
Refills: 0 | Status: DISCONTINUED | OUTPATIENT
Start: 2019-06-17 | End: 2019-06-18

## 2019-06-17 RX ORDER — PROPRANOLOL HCL 160 MG
1 CAPSULE, EXTENDED RELEASE 24HR ORAL
Qty: 0 | Refills: 0 | DISCHARGE

## 2019-06-17 RX ORDER — ASPIRIN/CALCIUM CARB/MAGNESIUM 324 MG
81 TABLET ORAL DAILY
Refills: 0 | Status: DISCONTINUED | OUTPATIENT
Start: 2019-06-17 | End: 2019-06-18

## 2019-06-17 RX ORDER — HEPARIN SODIUM 5000 [USP'U]/ML
5000 INJECTION INTRAVENOUS; SUBCUTANEOUS EVERY 8 HOURS
Refills: 0 | Status: DISCONTINUED | OUTPATIENT
Start: 2019-06-17 | End: 2019-06-18

## 2019-06-17 RX ORDER — SODIUM CHLORIDE 9 MG/ML
500 INJECTION, SOLUTION INTRAVENOUS ONCE
Refills: 0 | Status: COMPLETED | OUTPATIENT
Start: 2019-06-17 | End: 2019-06-17

## 2019-06-17 RX ORDER — BUDESONIDE, MICRONIZED 100 %
2 POWDER (GRAM) MISCELLANEOUS
Qty: 0 | Refills: 0 | DISCHARGE

## 2019-06-17 RX ORDER — DEXTROSE 50 % IN WATER 50 %
12.5 SYRINGE (ML) INTRAVENOUS ONCE
Refills: 0 | Status: DISCONTINUED | OUTPATIENT
Start: 2019-06-17 | End: 2019-06-18

## 2019-06-17 RX ORDER — INSULIN LISPRO 100/ML
VIAL (ML) SUBCUTANEOUS AT BEDTIME
Refills: 0 | Status: DISCONTINUED | OUTPATIENT
Start: 2019-06-17 | End: 2019-06-18

## 2019-06-17 RX ORDER — NYSTATIN 500MM UNIT
5 POWDER (EA) MISCELLANEOUS
Qty: 0 | Refills: 0 | DISCHARGE

## 2019-06-17 RX ORDER — SITAGLIPTIN 50 MG/1
1 TABLET, FILM COATED ORAL
Qty: 0 | Refills: 0 | DISCHARGE

## 2019-06-17 RX ORDER — DOCUSATE SODIUM 100 MG
100 CAPSULE ORAL THREE TIMES A DAY
Refills: 0 | Status: DISCONTINUED | OUTPATIENT
Start: 2019-06-17 | End: 2019-06-18

## 2019-06-17 RX ORDER — MAGNESIUM SULFATE 500 MG/ML
2 VIAL (ML) INJECTION ONCE
Refills: 0 | Status: COMPLETED | OUTPATIENT
Start: 2019-06-17 | End: 2019-06-17

## 2019-06-17 RX ORDER — HYDROMORPHONE HYDROCHLORIDE 2 MG/ML
30 INJECTION INTRAMUSCULAR; INTRAVENOUS; SUBCUTANEOUS
Refills: 0 | Status: DISCONTINUED | OUTPATIENT
Start: 2019-06-17 | End: 2019-06-18

## 2019-06-17 RX ORDER — PHENYLEPHRINE HYDROCHLORIDE 10 MG/ML
0.2 INJECTION INTRAVENOUS
Qty: 40 | Refills: 0 | Status: DISCONTINUED | OUTPATIENT
Start: 2019-06-17 | End: 2019-06-18

## 2019-06-17 RX ORDER — DEXMEDETOMIDINE HYDROCHLORIDE IN 0.9% SODIUM CHLORIDE 4 UG/ML
0.3 INJECTION INTRAVENOUS
Qty: 200 | Refills: 0 | Status: DISCONTINUED | OUTPATIENT
Start: 2019-06-17 | End: 2019-06-17

## 2019-06-17 RX ORDER — ACETAMINOPHEN 500 MG
1000 TABLET ORAL ONCE
Refills: 0 | Status: COMPLETED | OUTPATIENT
Start: 2019-06-17 | End: 2019-06-17

## 2019-06-17 RX ORDER — ONDANSETRON 8 MG/1
4 TABLET, FILM COATED ORAL EVERY 6 HOURS
Refills: 0 | Status: DISCONTINUED | OUTPATIENT
Start: 2019-06-17 | End: 2019-06-18

## 2019-06-17 RX ORDER — SELENIOUS ACID 40 UG/ML
0 INJECTION, SOLUTION INTRAVENOUS
Qty: 0 | Refills: 0 | DISCHARGE

## 2019-06-17 RX ORDER — FLUTICASONE FUROATE, UMECLIDINIUM BROMIDE AND VILANTEROL TRIFENATATE 200; 62.5; 25 UG/1; UG/1; UG/1
1 POWDER RESPIRATORY (INHALATION)
Qty: 0 | Refills: 0 | DISCHARGE

## 2019-06-17 RX ORDER — ALBUTEROL 90 UG/1
2 AEROSOL, METERED ORAL EVERY 6 HOURS
Refills: 0 | Status: DISCONTINUED | OUTPATIENT
Start: 2019-06-17 | End: 2019-06-18

## 2019-06-17 RX ORDER — CHOLECALCIFEROL (VITAMIN D3) 125 MCG
1 CAPSULE ORAL
Qty: 0 | Refills: 0 | DISCHARGE

## 2019-06-17 RX ADMIN — DEXMEDETOMIDINE HYDROCHLORIDE IN 0.9% SODIUM CHLORIDE 5.24 MICROGRAM(S)/KG/HR: 4 INJECTION INTRAVENOUS at 09:50

## 2019-06-17 RX ADMIN — Medication 1000 MILLIGRAM(S): at 16:15

## 2019-06-17 RX ADMIN — HYDROMORPHONE HYDROCHLORIDE 30 MILLILITER(S): 2 INJECTION INTRAMUSCULAR; INTRAVENOUS; SUBCUTANEOUS at 10:00

## 2019-06-17 RX ADMIN — HEPARIN SODIUM 5000 UNIT(S): 5000 INJECTION INTRAVENOUS; SUBCUTANEOUS at 22:00

## 2019-06-17 RX ADMIN — Medication 0.5 MILLIGRAM(S): at 21:27

## 2019-06-17 RX ADMIN — Medication 0.5 MILLIGRAM(S): at 22:10

## 2019-06-17 RX ADMIN — SENNA PLUS 2 TABLET(S): 8.6 TABLET ORAL at 21:58

## 2019-06-17 RX ADMIN — Medication 100 MILLIGRAM(S): at 21:55

## 2019-06-17 RX ADMIN — Medication 100 MILLIGRAM(S): at 13:37

## 2019-06-17 RX ADMIN — Medication 400 MILLIGRAM(S): at 22:30

## 2019-06-17 RX ADMIN — HYDROMORPHONE HYDROCHLORIDE 30 MILLILITER(S): 2 INJECTION INTRAMUSCULAR; INTRAVENOUS; SUBCUTANEOUS at 19:36

## 2019-06-17 RX ADMIN — SODIUM CHLORIDE 30 MILLILITER(S): 9 INJECTION, SOLUTION INTRAVENOUS at 11:10

## 2019-06-17 RX ADMIN — Medication 50 GRAM(S): at 13:38

## 2019-06-17 RX ADMIN — Medication 400 MILLIGRAM(S): at 16:00

## 2019-06-17 RX ADMIN — PHENYLEPHRINE HYDROCHLORIDE 5.24 MICROGRAM(S)/KG/MIN: 10 INJECTION INTRAVENOUS at 11:50

## 2019-06-17 RX ADMIN — SODIUM CHLORIDE 30 MILLILITER(S): 9 INJECTION, SOLUTION INTRAVENOUS at 06:52

## 2019-06-17 RX ADMIN — SODIUM CHLORIDE 1000 MILLILITER(S): 9 INJECTION, SOLUTION INTRAVENOUS at 11:10

## 2019-06-17 RX ADMIN — Medication 1: at 16:10

## 2019-06-17 RX ADMIN — Medication 81 MILLIGRAM(S): at 13:37

## 2019-06-17 RX ADMIN — ATORVASTATIN CALCIUM 10 MILLIGRAM(S): 80 TABLET, FILM COATED ORAL at 21:56

## 2019-06-17 RX ADMIN — Medication 20 MILLIGRAM(S): at 13:37

## 2019-06-17 RX ADMIN — HEPARIN SODIUM 5000 UNIT(S): 5000 INJECTION INTRAVENOUS; SUBCUTANEOUS at 13:37

## 2019-06-17 RX ADMIN — Medication 125 MILLILITER(S): at 11:20

## 2019-06-17 RX ADMIN — ALBUTEROL 2 PUFF(S): 90 AEROSOL, METERED ORAL at 15:33

## 2019-06-17 RX ADMIN — ALBUTEROL 2 PUFF(S): 90 AEROSOL, METERED ORAL at 21:24

## 2019-06-17 NOTE — PROGRESS NOTE ADULT - SUBJECTIVE AND OBJECTIVE BOX
JEFFREY LOZANO   MRN#: [id number]     The patient is a 85y Female who was seen, evaluated, & examined with the CTICU staff post-operatively with a multidisciplinary care plan formulated & implemented.  All available clinical, laboratory, radiographic, pharmacologic, and electrocardiographic data were reviewed & analyzed.      The patient was in the CTICU in critical condition secondary to:     persistent cardiopulmonary dysfunction  hypovolemia-shock    For support and evaluation & prevention of further decompensation secondary to persistent cardiopulmonary dysfunction, respiratory status required:     supplemental oxygen with nasal cannula  continuous pulse oximetry monitoring  following ABGs with A-line monitoring  IV Dexmedetomidine infusion    Continuous hemodynamic monitoring was required to ensure adequate cardiovascular support and to evaluate for & help prevent decompensation while receiving     intermittent volume expansion  IV Phenylephrine infusion    secondary to     hypovolemia-shock    In addition:  S/p right middle lobe wedge resection - came out of OR agitated   Started Precedex - patient remained calm and stable but shortly thereafter became hypotensive refractory to colloid and crystalloid fluid resuscitation - Phenylephrine started  Will wean off Phenylephrine as tolerated - it is decreasing as we are increasing fluids  Labs pending  Exam benign    The patient required critical care management and I personally provided 40 minutes of non-continuous care to the patient, excluding separate procedures, in addition to  discussing the patient and plan at length with the CTICU staff and helping coordinate care. JEFFREY LOZANO   MRN#: [id number]     The patient is a 85y Female who was seen, evaluated, & examined with the CTICU staff post-operatively with a multidisciplinary care plan formulated & implemented.  All available clinical, laboratory, radiographic, pharmacologic, and electrocardiographic data were reviewed & analyzed.      The patient was in the CTICU in critical condition secondary to:     persistent cardiopulmonary dysfunction  hypovolemia-shock    For support and evaluation & prevention of further decompensation secondary to persistent cardiopulmonary dysfunction, respiratory status required:     supplemental oxygen with nasal cannula  continuous pulse oximetry monitoring  following ABGs with A-line monitoring  IV Dexmedetomidine infusion    Continuous hemodynamic monitoring was required to ensure adequate cardiovascular support and to evaluate for & help prevent decompensation while receiving     intermittent volume expansion  IV Phenylephrine infusion    secondary to     hypovolemia-shock    In addition:  S/p right middle lobe wedge resection - came out of OR agitated   Started Precedex - patient remained calm and stable but shortly thereafter became hypotensive refractory to colloid and crystalloid fluid resuscitation   Precedex off (will get outpatient Klonopin), Phenylephrine started  Will wean off Phenylephrine as tolerated - it is decreasing as we are increasing fluids  Labs pending  Exam benign    The patient required critical care management and I personally provided 40 minutes of non-continuous care to the patient, excluding separate procedures, in addition to  discussing the patient and plan at length with the CTICU staff and helping coordinate care.

## 2019-06-17 NOTE — ASU PATIENT PROFILE, ADULT - PSH
H/O abdominoplasty  > 20 years ago  History of Partial Thyroidectomy  > 25 yrs ago  S/P Breast Lumpectomy  right - 1999  S/P cataract surgery  bilateral 2018  S/P hysterectomy  >5 years ago  S/P Laparoscopic Cholecystectomy  > 20 years ago  S/P Partial Lobectomy of Lung  left - 2007

## 2019-06-17 NOTE — PATIENT PROFILE ADULT - ABILITY TO HEAR (WITH HEARING AID OR HEARING APPLIANCE IF NORMALLY USED):
Pt has bilateral hearing aides in.   Pt has full upper dentures in./Mildly to Moderately Impaired: difficulty hearing in some environments or speaker may need to increase volume or speak distinctly

## 2019-06-17 NOTE — ASU PATIENT PROFILE, ADULT - PMH
Acid Reflux    Anxiety and depression    Anxiety Associated with Depression    Benign Essential Tremor    Diabetes Mellitus II    Dyslipidemia    Hx of Breast Cancer  1999 - right - s/p lumpectomy & radiation  Hypothyroidism    Lung Cancer  2007s/p left lobectomy  Osteoporosis    PE (Pulmonary Embolism)  2000  Uterine Leiomyoma

## 2019-06-17 NOTE — PATIENT PROFILE ADULT - BRADEN MOISTURE
Chief Complaint   Patient presents with   • Irritable Bowel Syndrome   • Abdominal Pain   • Diarrhea       ENDO PROCEDURE ORDERED:    Subjective    Mila Fisher is a 64 y.o. female. she is here today for follow-up.    History of Present Illness    Patient seen on a recheck of her diarrhea, IBS, abdominal pain, GERD. Last seen 01/08/2018. Patient completed the Flagyl. She states her diarrhea actually seemed to get worse initially on the Flagyl. She did undergo EGD/colonoscopy on 01/26/2018, showed a diffuse gastritis. Antral biopsy showed active chronic gastritis with positive H. pylori. Duodenal biopsy was negative. Colonoscopy showed a polyp in the rectum was a hyperplastic polyp. Also showed external/internal hemorrhoids, good prep. Random colon biopsy negative. Recommend repeat colonoscopy in no greater than 5 years.     Patient is currently taking Protonix 40 mg daily for chronic heartburn. She denies nausea, vomiting, dysphagia. She is still having a fair amount of diarrhea and abdominal tenderness and cramping despite taking the Bentyl.  Her weight is up 10 pounds since last visit. Previous EGD/colonoscopy in 2015 showed esophagitis, gastritis, colon polyp.    ASSESSMENT/PLAN: H. pylori positive gastritis with hyperplastic polyp in the colon. Patient encouraged to avoid gastric irritants. Will treat her H. pylori with Prilosec 20 mg b.i.d., clarithromycin 500 mg b.i.d., Flagyl 500 mg b.i.d. all for 14 days. She will hold her Protonix while taking the medication and will resume that as soon as she finishes. Will also hold her Lipitor for potential interaction with the clarithromycin. She will resume that once she finishes this course as well. Will plan followup in 6 weeks, further pending clinical course and the results of the above.        The following portions of the patient's history were reviewed and updated as appropriate:   Past Medical History:   Diagnosis Date   • Acute bronchitis    •  Anxiety     Anxiety state      • Arthropathy of lumbar facet joint    • Asthma    • Astigmatism    • At risk for adverse drug event     Adverse drug event resulting from treatment of disorder      • Back ache    • Benign essential hypertension    • CHF (congestive heart failure)    • Cholecystitis     mild on ultrasound      • Chronic back pain    • Contact dermatitis    • COPD (chronic obstructive pulmonary disease)    • Cough    • Depression     Depressive disorder, not elsewhere classified      • Diabetes mellitus     no retinopathy      • Diabetes mellitus without complication     type II or unspecified type, not stated as uncontrolled      • Diarrhea    • Drug therapy     Other long term (current) drug therapy      • Dyspnea    • Electrocardiogram abnormal    • Epigastric pain    • Episodic mood disorder     Unspecified    • Esophageal reflux    • Esophagitis     grade II   • Foot pain     VS SMALL PHALANX AVULSION      • Generalized abdominal pain    • Generalized anxiety disorder    • History of colon polyps    • Hypermetropia    • Irritable bowel syndrome    • Knee pain 03/22/2016     being evaluated for knee replacements.       • Malaise and fatigue    • Myofascial pain    • Nausea and vomiting    • Neck pain    • Onychomycosis    • LJ (obstructive sleep apnea)    • Osteoarthritis    • Pain in wrist    • Polyp of intestine     large intestine   • Pure hypercholesterolemia    • Right upper quadrant pain    • Screening for hyperlipidemia    • Shoulder pain 09/02/2014    possible Rotator Cuff Tendinitis      • Spasm of back muscles    • Sprain of sacroiliac ligament    • Transient cerebral ischemia    • Type 2 diabetes mellitus    • Type II diabetes mellitus, uncontrolled    • Upper respiratory infection    • Vitamin D deficiency      Past Surgical History:   Procedure Laterality Date   • CARPAL TUNNEL RELEASE     • COLONOSCOPY  06/10/2015   • COLONOSCOPY N/A 1/26/2018    Procedure: COLONOSCOPY--bx;  Surgeon:  Rg rC MD;  Location: [facility] ENDOSCOPY;  Service:    • COLONOSCOPY W/ POLYPECTOMY  06/10/2015    Colonoscopy remove polyps 58034 (1)      • ENDOSCOPY  06/10/2015    EGD w/ biopsy 71100 (1)      • ENDOSCOPY N/A 1/26/2018    Procedure: ESOPHAGOGASTRODUODENOSCOPY--urgent, bx;  Surgeon: Rg Cr MD;  Location: [facility] ENDOSCOPY;  Service:    • INJECTION OF MEDICATION  09/02/2014    Drain/Inject Intermed Joint 20605 (1)      • TUBAL ABDOMINAL LIGATION      Tubal ligation (1)      • UPPER GASTROINTESTINAL ENDOSCOPY  01/26/2018     Family History   Problem Relation Age of Onset   • Heart disease Other    • Bone cancer Other    • Lung cancer Other    • Throat cancer Other    • Cancer Other      Other   • Colon cancer Other      Colorectal Cancer   • Diabetes Other    • Hypertension Other      OB History     No data available        No Known Allergies  Social History     Social History   • Marital status: Single     Spouse name: N/A   • Number of children: N/A   • Years of education: N/A     Social History Main Topics   • Smoking status: Former Smoker     Quit date: 1996   • Smokeless tobacco: Never Used      Comment: non smoker for years   • Alcohol use No   • Drug use: No   • Sexual activity: Defer     Other Topics Concern   • None     Social History Narrative       Current Outpatient Prescriptions:   •  acetaminophen (TYLENOL 8 HOUR) 650 MG 8 hr tablet, Take 1 tablet by mouth Every 8 (Eight) Hours As Needed for Mild Pain ., Disp: 60 tablet, Rfl: 1  •  albuterol (PROVENTIL HFA;VENTOLIN HFA) 108 (90 BASE) MCG/ACT inhaler, Inhale 2 puffs Every 4 (Four) Hours As Needed for wheezing., Disp: , Rfl:   •  albuterol (PROVENTIL) (2.5 MG/3ML) 0.083% nebulizer solution, Take 2.5 mg by nebulization Every 4 (Four) Hours As Needed for wheezing., Disp: , Rfl:   •  atorvastatin (LIPITOR) 40 MG tablet, , Disp: , Rfl:   •  baclofen (LIORESAL) 10 MG tablet, Take 10 mg by mouth., Disp: , Rfl:   •  buPROPion XL (WELLBUTRIN XL)  "150 MG 24 hr tablet, Take 1 tablet by mouth Daily., Disp: 30 tablet, Rfl: 4  •  clopidogrel (PLAVIX) 75 MG tablet, Take 75 mg by mouth Daily., Disp: , Rfl:   •  dicyclomine (BENTYL) 20 MG tablet, Take 1 tablet by mouth Every 6 (Six) Hours., Disp: 90 tablet, Rfl: 5  •  furosemide (LASIX) 40 MG tablet, Take 40 mg by mouth Daily., Disp: , Rfl:   •  glucose blood (FREESTYLE LITE) test strip, 1 each by Other route As Needed. Use as instructed, Disp: , Rfl:   •  glucose blood test strip, Test tid dx code 250.00 on insulin, Disp: , Rfl:   •  HYDROcodone-acetaminophen (NORCO) 7.5-325 MG per tablet, Take 1 tablet by mouth Every 6 (Six) Hours As Needed., Disp: , Rfl:   •  insulin aspart prot-insulin aspart (novoLOG 70/30) (70-30) 100 UNIT/ML injection, Inject 25 Units under the skin 3 (Three) Times a Day With Meals., Disp: , Rfl:   •  Insulin Syringe-Needle U-100 (GNP INSULIN SYRINGE) 31G X 5/16\" 0.3 ML misc, , Disp: , Rfl:   •  isosorbide mononitrate (IMDUR) 30 MG 24 hr tablet, Take 30 mg by mouth Daily., Disp: , Rfl:   •  Lancets (ACCU-CHEK MULTICLIX) lancets, Use to test three times a day, Disp: , Rfl:   •  lisinopril (PRINIVIL,ZESTRIL) 5 MG tablet, Take 10 mg by mouth Daily., Disp: , Rfl:   •  magnesium oxide (MAGOX) 400 (241.3 MG) MG tablet tablet, Take 400 mg by mouth Daily., Disp: , Rfl:   •  meloxicam (MOBIC) 15 MG tablet, Take 15 mg by mouth Daily., Disp: , Rfl:   •  metFORMIN (GLUCOPHAGE) 1000 MG tablet, Take 1,000 mg by mouth 2 (Two) Times a Day With Meals., Disp: , Rfl:   •  metoprolol succinate XL (TOPROL-XL) 25 MG 24 hr tablet, Take 12.5 mg by mouth Daily., Disp: , Rfl:   •  pantoprazole (PROTONIX) 40 MG EC tablet, Take 1 tablet by mouth Daily., Disp: 30 tablet, Rfl: 5  •  polyethylene glycol (GoLYTELY) 236 g solution, As directed per instruction sheet for colonoscopy, Disp: 4000 mL, Rfl: 0  •  spironolactone (ALDACTONE) 25 MG tablet, Take 25 mg by mouth Daily., Disp: , Rfl: 11  •  traZODone (DESYREL) 50 MG " "tablet, Take 50 mg by mouth Every Night., Disp: , Rfl:   •  TRUEPLUS INSULIN SYRINGE 31G X 5/16\" 0.5 ML misc, , Disp: , Rfl:   •  vitamin D (ERGOCALCIFEROL) 92740 UNITS capsule capsule, Take 50,000 Units by mouth 1 (One) Time Per Week., Disp: , Rfl:   •  clarithromycin (BIAXIN) 500 MG tablet, Take 1 tablet by mouth 2 (Two) Times a Day., Disp: 28 tablet, Rfl: 0  •  metroNIDAZOLE (FLAGYL) 500 MG tablet, Take 1 tablet by mouth 2 (Two) Times a Day., Disp: 28 tablet, Rfl: 0  •  omeprazole (priLOSEC) 20 MG capsule, Take 1 capsule by mouth 2 (Two) Times a Day., Disp: 30 capsule, Rfl: 0  Review of Systems  Review of Systems       Objective    /56  Pulse 63  Ht 160 cm (62.99\")  Wt 129 kg (285 lb 3.2 oz)  BMI 50.53 kg/m2  Physical Exam   Constitutional: She is oriented to person, place, and time. She appears well-developed and well-nourished. No distress.   Chronically ill   HENT:   Head: Normocephalic and atraumatic.   Eyes: EOM are normal. Pupils are equal, round, and reactive to light.   Neck: Normal range of motion.   Cardiovascular: Normal rate, regular rhythm and normal heart sounds.    Pulmonary/Chest: Effort normal and breath sounds normal.   Abdominal: Soft. Bowel sounds are normal. She exhibits no shifting dullness, no distension, no abdominal bruit, no ascites and no mass. There is no hepatosplenomegaly. There is tenderness. There is no rigidity, no rebound, no guarding and no CVA tenderness. A hernia is present. Hernia confirmed negative in the ventral area.   Obese, mild diffuse. Moderate/large ventral diastasis   Musculoskeletal: Normal range of motion.   Neurological: She is alert and oriented to person, place, and time.   Skin: Skin is warm and dry.   Psychiatric: She has a normal mood and affect. Her behavior is normal. Judgment and thought content normal.   Nursing note and vitals reviewed.    Assessment/Plan      1. Helicobacter pylori gastritis    2. Gastroesophageal reflux disease with " esophagitis    3. Diarrhea, unspecified type    4. Hyperplastic colonic polyp, unspecified part of colon    .   Mila was seen today for irritable bowel syndrome, abdominal pain and diarrhea.    Diagnoses and all orders for this visit:    Helicobacter pylori gastritis    Gastroesophageal reflux disease with esophagitis    Diarrhea, unspecified type    Hyperplastic colonic polyp, unspecified part of colon    Other orders  -     metroNIDAZOLE (FLAGYL) 500 MG tablet; Take 1 tablet by mouth 2 (Two) Times a Day.  -     clarithromycin (BIAXIN) 500 MG tablet; Take 1 tablet by mouth 2 (Two) Times a Day.  -     omeprazole (priLOSEC) 20 MG capsule; Take 1 capsule by mouth 2 (Two) Times a Day.        Orders placed during this encounter include:  No orders of the defined types were placed in this encounter.      Medications prescribed:  New Medications Ordered This Visit   Medications   • metroNIDAZOLE (FLAGYL) 500 MG tablet     Sig: Take 1 tablet by mouth 2 (Two) Times a Day.     Dispense:  28 tablet     Refill:  0   • clarithromycin (BIAXIN) 500 MG tablet     Sig: Take 1 tablet by mouth 2 (Two) Times a Day.     Dispense:  28 tablet     Refill:  0   • omeprazole (priLOSEC) 20 MG capsule     Sig: Take 1 capsule by mouth 2 (Two) Times a Day.     Dispense:  30 capsule     Refill:  0     Discontinued Medications       Reason for Discontinue    pravastatin (PRAVACHOL) 40 MG tablet Discontinued by another clinician        Requested Prescriptions     Signed Prescriptions Disp Refills   • metroNIDAZOLE (FLAGYL) 500 MG tablet 28 tablet 0     Sig: Take 1 tablet by mouth 2 (Two) Times a Day.   • clarithromycin (BIAXIN) 500 MG tablet 28 tablet 0     Sig: Take 1 tablet by mouth 2 (Two) Times a Day.   • omeprazole (priLOSEC) 20 MG capsule 30 capsule 0     Sig: Take 1 capsule by mouth 2 (Two) Times a Day.       Review and/or summary of lab tests, radiology, procedures, medications. Review and summary of old records and obtaining of  history. The risks and benefits of my recommendations, as well as other treatment options were discussed with the patient today. Questions were answered.    Follow-up: Return in about 6 weeks (around 3/26/2018), or if symptoms worsen or fail to improve.     * Surgery not found *      This document has been electronically signed by Jarett Mcdonough PA-C on February 12, 2018 2:51 PM      Results for orders placed or performed during the hospital encounter of 01/26/18   Tissue Pathology Exam - Tissue, Gastric, Antrum   Result Value Ref Range    Case Report       Surgical Pathology Report                         Case: [id number]                                  Authorizing Provider:  Rg Cr MD         Collected:           01/26/2018 03:03 PM          Ordering Location:     [facility]             Received:            01/27/2018 09:59 AM                                 [facility]                                                     Pathologist:           Elier Chris MD                                                          Specimens:   1) - Gastric, Antrum, antrum                                                                        2) - Small Intestine, Duodenum, small bowel                                                         3) - Large Intestine, colonic mucosa                                                                4) - Large Intestine, Sigmoid Colon, sigmoid poly cold snare                               Final Diagnosis       1.  MUCOSA, ANTRUM OF STOMACH:  ACTIVE CHRONIC GASTRITIS.  POSITIVE FOR HELICOBACTER PYLORI (HP IMMUNOSTAIN).    2.  MUCOSA, DUODENUM:  NO SIGNIFICANT HISTOLOGIC ABNORMALITY.    3.  MUCOSA, COLON:  NO SIGNIFICANT HISTOLOGIC ABNORMALITY.    4.  POLYP, SIGMOID COLON:  HYPERPLASTIC POLYP.      Comment       Helicobacter pylori (HP) immunostain is performed because an appropriate inflammatory milieu is present and organisms are not seen on H & E stained  slides.    HP immunostain was developed and its performance characteristics determined by [facility].  It has not been cleared or approved by the U.S. Food and Drug Administration.  The FDA has determined that such clearance or approval is not necessary.  This test is used for clinical purposes.  It should not be regarded as investigational or for research.  This laboratory is certified under the Clinical Laboratory Improvement Amendments of 1988 (CLIA-88) as qualified to perform high complexity clinical laboratory testing.          Gross Description       Received for examination are 4 containers, each of which have nodular bits of white soft tissue measuring 0.3-0.5 cc in aggregate.  All specimens are embedded as labeled.  1A antrum of stomach; 2A duodenum; 3A mucosa of colon; 4A polyp, sigmoid colon (cold snare).      Embedded Images     POC Glucose Once   Result Value Ref Range    Glucose 179 (H) 70 - 130 mg/dL   Results for orders placed or performed in visit on 10/31/17   ToxASSURE Select 13 (MW) - Urine, Clean Catch   Result Value Ref Range    Report Summary FINAL    Results for orders placed or performed in visit on 09/11/17   Hepatitis panel, acute   Result Value Ref Range    Hepatitis C Ab Negative Negative    Hep A IgM Negative Negative    Hep B C IgM Negative Negative    Hepatitis B Surface Ag Negative Negative   Results for orders placed or performed in visit on 03/07/17   ToxASSURE Select 13 (MW)   Result Value Ref Range    Report Summary FINAL     PDF Image .    Results for orders placed or performed during the hospital encounter of 02/08/17   Gold Top - SST   Result Value Ref Range    Extra Tube Hold for add-ons.    Green Top (Gel)   Result Value Ref Range    Extra Tube Hold for add-ons.    CK-MB   Result Value Ref Range    CKMB 0.68 0.00 - 5.00 ng/mL   CBC Auto Differential   Result Value Ref Range    WBC 4.83 3.20 - 9.80 10*3/mm3    RBC 4.41 3.77 - 5.16 10*6/mm3     Hemoglobin 12.1 12.0 - 15.5 g/dL    Hematocrit 36.7 35.0 - 45.0 %    MCV 83.2 80.0 - 98.0 fL    MCH 27.4 26.5 - 34.0 pg    MCHC 33.0 31.4 - 36.0 g/dL    RDW 13.3 11.5 - 14.5 %    RDW-SD 40.0 36.4 - 46.3 fl    MPV 10.8 8.0 - 12.0 fL    Platelets 126 (L) 150 - 450 10*3/mm3    Neutrophil % 49.3 37.0 - 80.0 %    Lymphocyte % 42.2 10.0 - 50.0 %    Monocyte % 5.8 0.0 - 12.0 %    Eosinophil % 2.1 0.0 - 7.0 %    Basophil % 0.4 0.0 - 2.0 %    Immature Grans % 0.2 0.0 - 0.5 %    Neutrophils, Absolute 2.38 2.00 - 8.60 10*3/mm3    Lymphocytes, Absolute 2.04 0.60 - 4.20 10*3/mm3    Monocytes, Absolute 0.28 0.00 - 0.90 10*3/mm3    Eosinophils, Absolute 0.10 0.00 - 0.70 10*3/mm3    Basophils, Absolute 0.02 0.00 - 0.20 10*3/mm3    Immature Grans, Absolute 0.01 0.00 - 0.02 10*3/mm3   CBC Auto Differential   Result Value Ref Range    WBC 6.83 3.20 - 9.80 10*3/mm3    RBC 3.77 3.77 - 5.16 10*6/mm3    Hemoglobin 10.6 (L) 12.0 - 15.5 g/dL    Hematocrit 30.9 (L) 35.0 - 45.0 %    MCV 82.0 80.0 - 98.0 fL    MCH 28.1 26.5 - 34.0 pg    MCHC 34.3 31.4 - 36.0 g/dL    RDW 14.3 11.5 - 14.5 %    RDW-SD 42.8 36.4 - 46.3 fl    MPV 11.2 8.0 - 12.0 fL    Platelets 166 150 - 450 10*3/mm3    Neutrophil % 50.1 37.0 - 80.0 %    Lymphocyte % 40.7 10.0 - 50.0 %    Monocyte % 6.3 0.0 - 12.0 %    Eosinophil % 2.3 0.0 - 7.0 %    Basophil % 0.3 0.0 - 2.0 %    Immature Grans % 0.3 0.0 - 0.5 %    Neutrophils, Absolute 3.42 2.00 - 8.60 10*3/mm3    Lymphocytes, Absolute 2.78 0.60 - 4.20 10*3/mm3    Monocytes, Absolute 0.43 0.00 - 0.90 10*3/mm3    Eosinophils, Absolute 0.16 0.00 - 0.70 10*3/mm3    Basophils, Absolute 0.02 0.00 - 0.20 10*3/mm3    Immature Grans, Absolute 0.02 0.00 - 0.02 10*3/mm3   Lavender Top   Result Value Ref Range    Extra Tube hold for add-on    Light Blue Top   Result Value Ref Range    Extra Tube hold for add-on    Troponin   Result Value Ref Range    Troponin I <0.012 <=0.034 ng/mL   Troponin   Result Value Ref Range    Troponin I <0.012  <=0.034 ng/mL   D-dimer, Quantitative   Result Value Ref Range    D-Dimer, Quantitative <270 0 - 470 ng/mL (FEU)   D-dimer, Quantitative   Result Value Ref Range    D-Dimer, Quantitative <270 0 - 470 ng/mL (FEU)   POC Glucose Fingerstick   Result Value Ref Range    Glucose 275 (H) 70 - 130 mg/dL   POC Glucose Fingerstick   Result Value Ref Range    Glucose 192 (H) 70 - 130 mg/dL   POC Glucose Fingerstick   Result Value Ref Range    Glucose 153 (H) 70 - 130 mg/dL   BNP   Result Value Ref Range    proBNP 284.0 0.0 - 900.0 pg/mL   CK   Result Value Ref Range    Creatine Kinase 75 30 - 135 U/L   Comprehensive Metabolic Panel   Result Value Ref Range    Glucose 181 (H) 60 - 100 mg/dL    BUN 23 (H) 7 - 21 mg/dL    Creatinine 1.02 (H) 0.50 - 1.00 mg/dL    Sodium 137 137 - 145 mmol/L    Potassium 3.9 3.5 - 5.1 mmol/L    Chloride 103 95 - 110 mmol/L    CO2 27.0 22.0 - 31.0 mmol/L    Calcium 9.1 8.4 - 10.2 mg/dL    Total Protein 6.2 (L) 6.3 - 8.6 g/dL    Albumin 3.60 3.40 - 4.80 g/dL    ALT (SGPT) 16 9 - 52 U/L    AST (SGOT) 15 14 - 36 U/L    Alkaline Phosphatase 48 38 - 126 U/L    Total Bilirubin 0.3 0.2 - 1.3 mg/dL    eGFR Non  Amer 55 45 - 104 mL/min/1.73    Globulin 2.6 2.3 - 3.5 gm/dL    A/G Ratio 1.4 1.1 - 1.8 g/dL    BUN/Creatinine Ratio 22.5 7.0 - 25.0    Anion Gap 7.0 5.0 - 15.0 mmol/L   Comprehensive Metabolic Panel   Result Value Ref Range    Glucose 192 (H) 60 - 100 mg/dL    BUN 23 (H) 7 - 21 mg/dL    Creatinine 1.06 (H) 0.50 - 1.00 mg/dL    Sodium 140 137 - 145 mmol/L    Potassium 3.9 3.5 - 5.1 mmol/L    Chloride 103 95 - 110 mmol/L    CO2 28.0 22.0 - 31.0 mmol/L    Calcium 9.4 8.4 - 10.2 mg/dL    Total Protein 6.6 6.3 - 8.6 g/dL    Albumin 3.90 3.40 - 4.80 g/dL    ALT (SGPT) 26 9 - 52 U/L    AST (SGOT) 27 14 - 36 U/L    Alkaline Phosphatase 50 38 - 126 U/L    Total Bilirubin 0.3 0.2 - 1.3 mg/dL    eGFR Non  Amer 52 45 - 104 mL/min/1.73    Globulin 2.7 2.3 - 3.5 gm/dL    A/G Ratio 1.4 1.1 - 1.8 g/dL     BUN/Creatinine Ratio 21.7 7.0 - 25.0    Anion Gap 9.0 5.0 - 15.0 mmol/L     *Note: Due to a large number of results and/or encounters for the requested time period, some results have not been displayed. A complete set of results can be found in Results Review.       Some portions of this note have been dictated using voice recognition software and may contain errors and/or omissions.    (4) rarely moist

## 2019-06-17 NOTE — CONSULT NOTE ADULT - ATTENDING COMMENTS
I am a non participating [employer] physician seeing Pt in coverage for Dr Frias I am a non participating [facility] physician seeing Pt in coverage for Dr Frias. The above patient examination was reviewed with Dr. Childers and I agree with his evaluation, assessment and treatment plan.  Junior Frias M.D.

## 2019-06-17 NOTE — CONSULT NOTE ADULT - SUBJECTIVE AND OBJECTIVE BOX
86 yo female with hx of DM T2, COPD, GERD, breast cancer s/p right breast lumpectomy & RT (1999), left lung cancer s/p left lobectomy (2007) seen s/p VATS an wedge resection of a  solitary pulmonary nodule. Patient reports, had routine CT scan of chest, followed by PET/CT (5/2019) which revealed "abnormality in right lung, was sent for further evaluation. Patient seen now in CTICU post op resting comfortably.     PAST MEDICAL & SURGICAL HISTORY:  Anxiety and depression  Uterine Leiomyoma  PE (Pulmonary Embolism): 2000  Lung Cancer: 2007s/p left lobectomy  Hx of Breast Cancer: 1999 - right - s/p lumpectomy &amp; radiation  Osteoporosis  Diabetes Mellitus II  Hypothyroidism  Acid Reflux  Dyslipidemia  Anxiety Associated with Depression  Benign Essential Tremor  S/P cataract surgery: bilateral 2018  H/O abdominoplasty: &gt; 20 years ago  S/P hysterectomy: &gt;5 years ago  S/P Partial Lobectomy of Lung: left - 2007  S/P Breast Lumpectomy: right - 1999  S/P Laparoscopic Cholecystectomy: &gt; 20 years ago  History of Partial Thyroidectomy: &gt; 25 yrs ago        MEDICATIONS  (STANDING):  ALBUTerol    90 MICROgram(s) HFA Inhaler 2 Puff(s) Inhalation every 6 hours  aspirin enteric coated 81 milliGRAM(s) Oral daily  atorvastatin 10 milliGRAM(s) Oral at bedtime  buDESOnide   0.5 milliGRAM(s) Respule 0.5 milliGRAM(s) Inhalation two times a day  clonazePAM  Tablet 0.5 milliGRAM(s) Oral daily  dexmedetomidine Infusion 0.3 MICROgram(s)/kG/Hr (5.243 mL/Hr) IV Continuous <Continuous>  dextrose 5%. 1000 milliLiter(s) (50 mL/Hr) IV Continuous <Continuous>  dextrose 50% Injectable 12.5 Gram(s) IV Push once  dextrose 50% Injectable 25 Gram(s) IV Push once  dextrose 50% Injectable 25 Gram(s) IV Push once  docusate sodium 100 milliGRAM(s) Oral three times a day  heparin  Injectable 5000 Unit(s) SubCutaneous every 8 hours  HYDROmorphone PCA (1 mG/mL) 30 milliLiter(s) PCA Continuous PCA Continuous  insulin lispro (HumaLOG) corrective regimen sliding scale   SubCutaneous three times a day before meals  insulin lispro (HumaLOG) corrective regimen sliding scale   SubCutaneous at bedtime  lactated ringers. 1000 milliLiter(s) (30 mL/Hr) IV Continuous <Continuous>  levothyroxine 88 MICROGram(s) Oral daily  PARoxetine 20 milliGRAM(s) Oral daily  phenylephrine    Infusion 0.2 MICROgram(s)/kG/Min (5.243 mL/Hr) IV Continuous <Continuous>  senna 2 Tablet(s) Oral at bedtime  tiotropium 18 MICROgram(s) Capsule 1 Capsule(s) Inhalation daily    MEDICATIONS  (PRN):  dextrose 40% Gel 15 Gram(s) Oral once PRN Blood Glucose LESS THAN 70 milliGRAM(s)/deciliter  glucagon  Injectable 1 milliGRAM(s) IntraMuscular once PRN Glucose LESS THAN 70 milligrams/deciliter  HYDROmorphone PCA (1 mG/mL) Rescue Clinician Bolus 0.5 milliGRAM(s) IV Push every 15 minutes PRN for Pain Scale GREATER THAN 6  naloxone Injectable 0.1 milliGRAM(s) IV Push every 3 minutes PRN For ANY of the following changes in patient status:  A. RR LESS THAN 10 breaths per minute, B. Oxygen saturation LESS THAN 90%, C. Sedation score of 6  ondansetron Injectable 4 milliGRAM(s) IV Push every 6 hours PRN Nausea    Social Hx:  Tobacco: quit in 2007	  ETOH: socially  Drugs: Neg    Family hx:  Prostate Cancer  osteoporosis  tremors with head bobbing        CONSTITUTIONAL: No weakness, fevers or chills  EYES/ENT: No visual changes;  No vertigo or throat pain   NECK: No pain or stiffness  RESPIRATORY: No cough, wheezing, hemoptysis; No shortness of breath  CARDIOVASCULAR: No chest pain or palpitations  GASTROINTESTINAL: No abdominal or epigastric pain. No nausea, vomiting, or hematemesis; No diarrhea or constipation. No melena or hematochezia.  GENITOURINARY: No dysuria, frequency or hematuria  NEUROLOGICAL: No numbness or weakness  SKIN: No itching, burning, rashes, or lesions   All other review of systems is negative unless indicated above.    INTERVAL HPI/OVERNIGHT EVENTS:  T(C): 36.4 (06-17-19 @ 09:45), Max: 36.8 (06-17-19 @ 06:19)  HR: 60 (06-17-19 @ 11:30) (59 - 79)  BP: 127/55 (06-17-19 @ 11:30) (82/37 - 153/60)  RR: 17 (06-17-19 @ 11:30) (13 - 29)  SpO2: 95% (06-17-19 @ 11:30) (94% - 98%)  Wt(kg): --  I&O's Summary      PHYSICAL EXAM:  GENERAL: NAD, well-groomed, well-developed  HEAD:  Atraumatic, Normocephalic  EYES: EOMI, PERRLA, conjunctiva and sclera clear  ENMT: No tonsillar erythema, exudates, or enlargement; Moist mucous membranes, Good dentition, No lesions  NECK: Supple, No JVD, Normal thyroid  NERVOUS SYSTEM: lethargic but arousable  CHEST/LUNG: Clear to percussion bilaterally; No rales, rhonchi, wheezing, or rubs  HEART: Regular rate and rhythm; No murmurs, rubs, or gallops  ABDOMEN: Soft, Nontender, Nondistended; Bowel sounds present  EXTREMITIES:  2+ Peripheral Pulses, No clubbing, cyanosis, or edema  LYMPH: No lymphadenopathy noted  SKIN: No rashes or lesions        LABS:              CAPILLARY BLOOD GLUCOSE      POCT Blood Glucose.: 170 mg/dL (17 Jun 2019 11:58)  POCT Blood Glucose.: 102 mg/dL (17 Jun 2019 06:37)            Radiology reports:

## 2019-06-17 NOTE — CONSULT NOTE ADULT - ASSESSMENT
86 yo woman with a hx of lung cancer in the past, found to have a pulmonary nodule. Patient seen by pulmonary and thoracic surgery and decision was made to do a VATS with a wedge resection of the right upper lobe nodule 86 yo woman with a hx of lung cancer in the past, found to have a pulmonary nodule. Patient seen by pulmonary and thoracic surgery and decision was made to do a VATS with a wedge resection of the right upper lobe nodules

## 2019-06-18 ENCOUNTER — TRANSCRIPTION ENCOUNTER (OUTPATIENT)
Age: 84
End: 2019-06-18

## 2019-06-18 VITALS
OXYGEN SATURATION: 96 % | HEART RATE: 63 BPM | TEMPERATURE: 98 F | DIASTOLIC BLOOD PRESSURE: 81 MMHG | SYSTOLIC BLOOD PRESSURE: 101 MMHG | RESPIRATION RATE: 18 BRPM

## 2019-06-18 DIAGNOSIS — R91.8 OTHER NONSPECIFIC ABNORMAL FINDING OF LUNG FIELD: ICD-10-CM

## 2019-06-18 LAB
ANION GAP SERPL CALC-SCNC: 13 MMO/L — SIGNIFICANT CHANGE UP (ref 7–14)
BASOPHILS # BLD AUTO: 0.01 K/UL — SIGNIFICANT CHANGE UP (ref 0–0.2)
BASOPHILS NFR BLD AUTO: 0.1 % — SIGNIFICANT CHANGE UP (ref 0–2)
BUN SERPL-MCNC: 22 MG/DL — SIGNIFICANT CHANGE UP (ref 7–23)
CALCIUM SERPL-MCNC: 9.3 MG/DL — SIGNIFICANT CHANGE UP (ref 8.4–10.5)
CHLORIDE SERPL-SCNC: 105 MMOL/L — SIGNIFICANT CHANGE UP (ref 98–107)
CO2 SERPL-SCNC: 21 MMOL/L — LOW (ref 22–31)
CREAT SERPL-MCNC: 0.84 MG/DL — SIGNIFICANT CHANGE UP (ref 0.5–1.3)
CULTURE - ACID FAST SMEAR CONCENTRATED: SIGNIFICANT CHANGE UP
EOSINOPHIL # BLD AUTO: 0 K/UL — SIGNIFICANT CHANGE UP (ref 0–0.5)
EOSINOPHIL NFR BLD AUTO: 0 % — SIGNIFICANT CHANGE UP (ref 0–6)
GLUCOSE BLDC GLUCOMTR-MCNC: 128 MG/DL — HIGH (ref 70–99)
GLUCOSE BLDC GLUCOMTR-MCNC: 160 MG/DL — HIGH (ref 70–99)
GLUCOSE BLDC GLUCOMTR-MCNC: 216 MG/DL — HIGH (ref 70–99)
GLUCOSE SERPL-MCNC: 194 MG/DL — HIGH (ref 70–99)
HCT VFR BLD CALC: 33.7 % — LOW (ref 34.5–45)
HGB BLD-MCNC: 11 G/DL — LOW (ref 11.5–15.5)
IMM GRANULOCYTES NFR BLD AUTO: 0.5 % — SIGNIFICANT CHANGE UP (ref 0–1.5)
LYMPHOCYTES # BLD AUTO: 1.16 K/UL — SIGNIFICANT CHANGE UP (ref 1–3.3)
LYMPHOCYTES # BLD AUTO: 13.2 % — SIGNIFICANT CHANGE UP (ref 13–44)
MCHC RBC-ENTMCNC: 30.1 PG — SIGNIFICANT CHANGE UP (ref 27–34)
MCHC RBC-ENTMCNC: 32.6 % — SIGNIFICANT CHANGE UP (ref 32–36)
MCV RBC AUTO: 92.1 FL — SIGNIFICANT CHANGE UP (ref 80–100)
MONOCYTES # BLD AUTO: 0.65 K/UL — SIGNIFICANT CHANGE UP (ref 0–0.9)
MONOCYTES NFR BLD AUTO: 7.4 % — SIGNIFICANT CHANGE UP (ref 2–14)
NEUTROPHILS # BLD AUTO: 6.93 K/UL — SIGNIFICANT CHANGE UP (ref 1.8–7.4)
NEUTROPHILS NFR BLD AUTO: 78.8 % — HIGH (ref 43–77)
NRBC # FLD: 0 K/UL — SIGNIFICANT CHANGE UP (ref 0–0)
PLATELET # BLD AUTO: 196 K/UL — SIGNIFICANT CHANGE UP (ref 150–400)
PMV BLD: 10.8 FL — SIGNIFICANT CHANGE UP (ref 7–13)
POTASSIUM SERPL-MCNC: 4.6 MMOL/L — SIGNIFICANT CHANGE UP (ref 3.5–5.3)
POTASSIUM SERPL-SCNC: 4.6 MMOL/L — SIGNIFICANT CHANGE UP (ref 3.5–5.3)
RBC # BLD: 3.66 M/UL — LOW (ref 3.8–5.2)
RBC # FLD: 14.6 % — HIGH (ref 10.3–14.5)
SODIUM SERPL-SCNC: 139 MMOL/L — SIGNIFICANT CHANGE UP (ref 135–145)
SPECIMEN SOURCE: SIGNIFICANT CHANGE UP
SPECIMEN SOURCE: SIGNIFICANT CHANGE UP
WBC # BLD: 8.79 K/UL — SIGNIFICANT CHANGE UP (ref 3.8–10.5)
WBC # FLD AUTO: 8.79 K/UL — SIGNIFICANT CHANGE UP (ref 3.8–10.5)

## 2019-06-18 PROCEDURE — 71045 X-RAY EXAM CHEST 1 VIEW: CPT | Mod: 26,77

## 2019-06-18 PROCEDURE — 99233 SBSQ HOSP IP/OBS HIGH 50: CPT

## 2019-06-18 PROCEDURE — 71045 X-RAY EXAM CHEST 1 VIEW: CPT | Mod: 26

## 2019-06-18 RX ORDER — OXYCODONE HYDROCHLORIDE 5 MG/1
5 TABLET ORAL
Refills: 0 | Status: DISCONTINUED | OUTPATIENT
Start: 2019-06-18 | End: 2019-06-18

## 2019-06-18 RX ORDER — DOCUSATE SODIUM 100 MG
1 CAPSULE ORAL
Qty: 42 | Refills: 0
Start: 2019-06-18 | End: 2019-07-01

## 2019-06-18 RX ORDER — ACETAMINOPHEN 500 MG
2 TABLET ORAL
Qty: 112 | Refills: 0
Start: 2019-06-18 | End: 2019-07-01

## 2019-06-18 RX ORDER — ASPIRIN/CALCIUM CARB/MAGNESIUM 324 MG
1 TABLET ORAL
Qty: 0 | Refills: 0 | DISCHARGE

## 2019-06-18 RX ORDER — ACETAMINOPHEN 500 MG
1000 TABLET ORAL ONCE
Refills: 0 | Status: COMPLETED | OUTPATIENT
Start: 2019-06-18 | End: 2019-06-17

## 2019-06-18 RX ORDER — SENNA PLUS 8.6 MG/1
2 TABLET ORAL
Qty: 28 | Refills: 0
Start: 2019-06-18 | End: 2019-07-01

## 2019-06-18 RX ORDER — ACETAMINOPHEN 500 MG
650 TABLET ORAL EVERY 6 HOURS
Refills: 0 | Status: DISCONTINUED | OUTPATIENT
Start: 2019-06-18 | End: 2019-06-18

## 2019-06-18 RX ORDER — CALCIUM CARBONATE 500(1250)
1 TABLET ORAL ONCE
Refills: 0 | Status: COMPLETED | OUTPATIENT
Start: 2019-06-18 | End: 2019-06-18

## 2019-06-18 RX ORDER — OXYCODONE HYDROCHLORIDE 5 MG/1
1 TABLET ORAL
Qty: 20 | Refills: 0
Start: 2019-06-18 | End: 2019-06-22

## 2019-06-18 RX ADMIN — Medication 650 MILLIGRAM(S): at 13:44

## 2019-06-18 RX ADMIN — Medication 100 MILLIGRAM(S): at 13:14

## 2019-06-18 RX ADMIN — Medication 1 TABLET(S): at 07:41

## 2019-06-18 RX ADMIN — Medication 0.5 MILLIGRAM(S): at 10:43

## 2019-06-18 RX ADMIN — ALBUTEROL 2 PUFF(S): 90 AEROSOL, METERED ORAL at 11:16

## 2019-06-18 RX ADMIN — HEPARIN SODIUM 5000 UNIT(S): 5000 INJECTION INTRAVENOUS; SUBCUTANEOUS at 13:14

## 2019-06-18 RX ADMIN — ALBUTEROL 2 PUFF(S): 90 AEROSOL, METERED ORAL at 04:09

## 2019-06-18 RX ADMIN — Medication 1: at 07:42

## 2019-06-18 RX ADMIN — Medication 650 MILLIGRAM(S): at 13:14

## 2019-06-18 RX ADMIN — Medication 88 MICROGRAM(S): at 05:58

## 2019-06-18 RX ADMIN — HEPARIN SODIUM 5000 UNIT(S): 5000 INJECTION INTRAVENOUS; SUBCUTANEOUS at 05:58

## 2019-06-18 RX ADMIN — SODIUM CHLORIDE 30 MILLILITER(S): 9 INJECTION, SOLUTION INTRAVENOUS at 07:32

## 2019-06-18 RX ADMIN — HYDROMORPHONE HYDROCHLORIDE 30 MILLILITER(S): 2 INJECTION INTRAMUSCULAR; INTRAVENOUS; SUBCUTANEOUS at 07:31

## 2019-06-18 RX ADMIN — Medication 81 MILLIGRAM(S): at 13:15

## 2019-06-18 RX ADMIN — Medication 100 MILLIGRAM(S): at 05:58

## 2019-06-18 NOTE — DISCHARGE NOTE PROVIDER - NSDCACTIVITY_GEN_ALL_CORE
Walking - Outdoors allowed/Do not drive or operate machinery/Do not make important decisions/Showering allowed/Sex allowed/Stairs allowed/Walking - Indoors allowed/No heavy lifting/straining

## 2019-06-18 NOTE — DISCHARGE NOTE NURSING/CASE MANAGEMENT/SOCIAL WORK - NSDCDPATPORTLINK_GEN_ALL_CORE
You can access the [employer] Patient Portal, offered by [facility], by registering with the following website: [email or web address]

## 2019-06-18 NOTE — PHYSICAL THERAPY INITIAL EVALUATION ADULT - PERTINENT HX OF CURRENT PROBLEM, REHAB EVAL
Patient is an 85 year old female admitted to [facility] on 6/17 with preop dx of solitary pulmonary nodule. Patient reports, had routine CT scan of chest, followed by PET/CT (5/2019) which revealed "abnormality in right lung, was sent for further evaluation, seen by Dr. Kline. Pt now s/p Right middle lobe wedge resection on 6/18/19. PMH: DM T2, COPD, GERD, breast cancer s/p right breast lumpectomy & RT (1999), left lung cancer s/p left lobectomy (2007).

## 2019-06-18 NOTE — DISCHARGE NOTE NURSING/CASE MANAGEMENT/SOCIAL WORK - NSDCPEWEB_GEN_ALL_CORE
[facility] for Tobacco Control website --- [email or web address] website --- [email or web address]

## 2019-06-18 NOTE — DISCHARGE NOTE PROVIDER - CARE PROVIDER_API CALL
Huber Kline)  Thoracic Surgery  [id number] [place] [address]  Phone: [phone]  Fax: [phone]  Follow Up Time:

## 2019-06-18 NOTE — PROGRESS NOTE ADULT - SUBJECTIVE AND OBJECTIVE BOX
JEFFREY LOZANO  MRN-[id number]    Patient is a 85y old  Female who presents with a chief complaint of VATS for pulmonary nodules (17 Jun 2019 12:11)    HPI:  86 yo female with history of diabetes, hypothyroidism, anxiety/depression, pulmonary embolism, COPD, GERD, breast cancer s/p right breast lumpectomy & RT (1999), left lung cancer s/p left lobectomy (2007) was found to have a new solitary pulmonary nodule s/p right middle lobe wedge resection. Per history the patient had a routine CT scan of chest, followed by PET/CT (5/2019) which revealed a new nodule in right lung. She is now s/p right video assisted thoracoscopy and right middle wedge resection on 6/17/2019. She has no complaints.     PAST MEDICAL & SURGICAL HISTORY:  Anxiety and depression  Uterine Leiomyoma  PE (Pulmonary Embolism): 2000  Lung Cancer: 2007s/p left lobectomy  Hx of Breast Cancer: 1999 - right - s/p lumpectomy &amp; radiation  Osteoporosis  Diabetes Mellitus II  Hypothyroidism  Acid Reflux  Dyslipidemia  Anxiety Associated with Depression  Benign Essential Tremor  S/P cataract surgery: bilateral 2018  H/O abdominoplasty: &gt; 20 years ago  S/P hysterectomy: &gt;5 years ago  S/P Partial Lobectomy of Lung: left - 2007  S/P Breast Lumpectomy: right - 1999  S/P Laparoscopic Cholecystectomy: &gt; 20 years ago  History of Partial Thyroidectomy: &gt; 25 yrs ago    FAMILY HISTORY:  FH: colon cancer: father  FH: prostate cancer: father    Social History:  Denies illicit drug use or frequent alcohol consumption. Endorses prior smoking history.     Allergies  No Known Allergies    MEDICATIONS  (STANDING):  acetaminophen  IVPB .. 1000 milliGRAM(s) IV Intermittent once  ALBUTerol    90 MICROgram(s) HFA Inhaler 2 Puff(s) Inhalation every 6 hours  aspirin enteric coated 81 milliGRAM(s) Oral daily  atorvastatin 10 milliGRAM(s) Oral at bedtime  buDESOnide   0.5 milliGRAM(s) Respule 0.5 milliGRAM(s) Inhalation two times a day  calcium carbonate    500 mG (Tums) Chewable 1 Tablet(s) Chew once  clonazePAM  Tablet 0.5 milliGRAM(s) Oral daily  dextrose 5%. 1000 milliLiter(s) (50 mL/Hr) IV Continuous <Continuous>  dextrose 50% Injectable 12.5 Gram(s) IV Push once  dextrose 50% Injectable 25 Gram(s) IV Push once  dextrose 50% Injectable 25 Gram(s) IV Push once  docusate sodium 100 milliGRAM(s) Oral three times a day  heparin  Injectable 5000 Unit(s) SubCutaneous every 8 hours  HYDROmorphone PCA (1 mG/mL) 30 milliLiter(s) PCA Continuous PCA Continuous  insulin lispro (HumaLOG) corrective regimen sliding scale   SubCutaneous three times a day before meals  insulin lispro (HumaLOG) corrective regimen sliding scale   SubCutaneous at bedtime  lactated ringers. 1000 milliLiter(s) (30 mL/Hr) IV Continuous <Continuous>  levothyroxine 88 MICROGram(s) Oral daily  PARoxetine 20 milliGRAM(s) Oral daily  phenylephrine    Infusion 0.2 MICROgram(s)/kG/Min (5.243 mL/Hr) IV Continuous <Continuous>  senna 2 Tablet(s) Oral at bedtime  tiotropium 18 MICROgram(s) Capsule 1 Capsule(s) Inhalation daily    MEDICATIONS  (PRN):  dextrose 40% Gel 15 Gram(s) Oral once PRN Blood Glucose LESS THAN 70 milliGRAM(s)/deciliter  glucagon  Injectable 1 milliGRAM(s) IntraMuscular once PRN Glucose LESS THAN 70 milligrams/deciliter  HYDROmorphone PCA (1 mG/mL) Rescue Clinician Bolus 0.5 milliGRAM(s) IV Push every 15 minutes PRN for Pain Scale GREATER THAN 6  naloxone Injectable 0.1 milliGRAM(s) IV Push every 3 minutes PRN For ANY of the following changes in patient status:  A. RR LESS THAN 10 breaths per minute, B. Oxygen saturation LESS THAN 90%, C. Sedation score of 6  ondansetron Injectable 4 milliGRAM(s) IV Push every 6 hours PRN Nausea    Review of Systems:  Constitutional:  Negative for weight change, fever, malaise  HEENT:  Negative for sinus pain, hoarseness, sore throat, dysphagia, vision changes  Cardiovascular:  Negative for chest pain, palpitations, dizziness  Respiratory:  Negative for cough, wheezing, dyspnea  Gastrointestinal:  Negative for nausea, vomiting, diarrhea, melena  Musculoskeletal:  Negative for pain, swelling, stiffness   Neuro:  Negative for weakness, numbness, headache  Psych:  Negative for anxiety, depression  Endocrine:  Negative for polyuria, polydipsia, temperature Intolerance    All other systems negative unless otherwise stated    ICU Vital Signs Last 24 Hrs  T(C): 36.7 (18 Jun 2019 04:00), Max: 36.8 (17 Jun 2019 06:19)  T(F): 98 (18 Jun 2019 04:00), Max: 98.2 (17 Jun 2019 06:19)  HR: 71 (18 Jun 2019 05:00) (54 - 146)  BP: 113/34 (18 Jun 2019 04:00) (82/37 - 153/60)  BP(mean): 53 (18 Jun 2019 04:00) (49 - 102)  ABP: --  ABP(mean): --  RR: 16 (18 Jun 2019 05:00) (12 - 29)  SpO2: 97% (18 Jun 2019 05:00) (82% - 98%)    Daily Height in cm: 152.4 (17 Jun 2019 06:19)    Daily   I&O's Summary    17 Jun 2019 07:01  -  18 Jun 2019 06:06  --------------------------------------------------------  IN: 1853.7 mL / OUT: 1830 mL / NET: 23.7 mL    Physical Exam:  Gen: Alert, no apparent distress  CV: Regular rate and rhythm no murmurs, rubs or gallops  Pulm: Clear to auscultation bilaterally, no rales, rhonchi or wheezes  Chest: Chest tubes/drains in place with dressings clean, dry and intact  GI: Abd is soft, non-tender and non-distended with +BS  Ext: No clubbing, cyanosis or edema  Neuro: A+Ox3, follows commands and moves all extremities    Labs:                          11.0   8.79  )-----------( 196      ( 18 Jun 2019 04:50 )             33.7       06-18    139  |  105  |  22  ----------------------------<  194<H>  4.6   |  21<L>  |  0.84    Ca    9.3      18 Jun 2019 04:50  Phos  2.7     06-17  Mg     1.4     06-17    Assessment/Plan: 86 yo female with history of diabetes, hypothyroidism, anxiety/depression, pulmonary embolism, COPD, GERD, breast cancer s/p right breast lumpectomy & RT (1999), left lung cancer s/p left lobectomy (2007) was found to have a new solitary pulmonary nodule s/p right middle lobe wedge resection.    Neuro:   Pain control with PCA / Tylenol IV   C/w Klonopin, Paxil                                          Cardiovascular:    Stable hemodynamics  Not on any pressors  ASA, statin  Continue hemodynamic monitoring    Respiratory:  Pt is comfortable on nasal cannula  Encourage incentive spirometry  Continue nebulizers  Monitor chest tube output  Chest tube to suction    GI:  Tolerating diabetic diet  Continue bowel regimen, Tums for heartburn  Continue Zofran for nausea - PRN	          Renal:  Continue LR 30CC/hr        Monitor I/Os and electrolytes    Hematologic / Oncology:  No signs of active bleeding, H/H stable  Monitor chest tube output    HSQ for DVT ppl    Infectious disease:  All surgical incision / chest tube sites look clean  No fever or other signs of infection     Endocrine:  Continue Accuchecks with coverage  Continue Synthroid    All clinical, lab, hemodynamic and radiographic data were reviewed and the plan was discussed with CTICU team.     Jesus Camacho MD

## 2019-06-18 NOTE — DISCHARGE NOTE PROVIDER - HOSPITAL COURSE
86 yo female with hx of DM T2, COPD, GERD, breast cancer s/p right breast lumpectomy & RT (1999), left lung cancer s/p left lobectomy (2007) presents to have PST evaluation with preop dx of solitary pulmonary nodule. Patient reports, had routine CT scan of chest, followed by PET/CT (5/2019) which revealed "abnormality in right lung, was sent for further evaluation, seen by Dr. Kline    On 6/17/19 pt had a Rt. VATs, RML wedge resection. Post op non complicated.    CT removed at bedside today FU CXR stable . Pt. w ecchymosis/hematoma at left anticubital site from previous IV. Now removed. Site soft, NT. Warm compresses in use.     . Pt feels well. Ambulating frequently.     NO CP or SOB. VATS c/d/i. All VSS. Cleared for d/c back to Assisted living by Dr. Kline.     Vital Signs Last 24 Hrs    T(C): 36.7 (18 Jun 2019 09:45), Max: 36.8 (18 Jun 2019 08:00)    T(F): 98 (18 Jun 2019 09:45), Max: 98.2 (18 Jun 2019 08:00)    HR: 60 (18 Jun 2019 10:44) (54 - 146)    BP: 122/60 (18 Jun 2019 09:45) (99/48 - 142/52)    BP(mean): 75 (18 Jun 2019 08:00) (53 - 102)    RR: 16 (18 Jun 2019 09:45) (12 - 23)    SpO2: 100% (18 Jun 2019 09:45) (82% - 100%)

## 2019-06-18 NOTE — PROGRESS NOTE ADULT - SUBJECTIVE AND OBJECTIVE BOX
ANESTHESIA POSTOP CHECK    85y Female POD#1 s/p R VATS wedge resection  Patient seen and examined at bedside.     Vital Signs Last 24 Hrs  T(C): 36.7 (18 Jun 2019 09:45), Max: 36.8 (18 Jun 2019 08:00)  T(F): 98 (18 Jun 2019 09:45), Max: 98.2 (18 Jun 2019 08:00)  HR: 60 (18 Jun 2019 10:44) (54 - 146)  BP: 122/60 (18 Jun 2019 09:45) (99/48 - 142/52)  BP(mean): 75 (18 Jun 2019 08:00) (53 - 102)  RR: 16 (18 Jun 2019 09:45) (12 - 23)  SpO2: 100% (18 Jun 2019 09:45) (82% - 100%)  I&O's Summary    17 Jun 2019 07:01  -  18 Jun 2019 07:00  --------------------------------------------------------  IN: 1883.7 mL / OUT: 1830 mL / NET: 53.7 mL    18 Jun 2019 07:01  -  18 Jun 2019 11:31  --------------------------------------------------------  IN: 30 mL / OUT: 0 mL / NET: 30 mL        No apparent anesthetic complications at this time.  All questions were answered.      Brina Altamirano D.O.  Anesthesia CA-1   Pager [phone]

## 2019-06-18 NOTE — PHYSICAL THERAPY INITIAL EVALUATION ADULT - ADDITIONAL COMMENTS
Patient resides at [facility]. Patient reports she ambulate independently prior to admission, and required varying levels of assistance from the staff at [facility].    Patient was left sitting at the edge of the bed as found, all lines/tubes intact and call riley within reach, DON ryder

## 2019-06-18 NOTE — PROGRESS NOTE ADULT - SUBJECTIVE AND OBJECTIVE BOX
Anesthesia Pain Management Service- Attending Addendum    SUBJECTIVE: Patient's pain control adequate    Therapy:	  [ X] IV PCA	   [ ] Epidural           [ ] s/p Spinal Opoid              [ ] Postpartum infusion	  [ ] Patient controlled regional anesthesia (PCRA)    [ ] prn Analgesics    Allergies    No Known Allergies    Intolerances      MEDICATIONS  (STANDING):  acetaminophen   Tablet .. 650 milliGRAM(s) Oral every 6 hours  ALBUTerol    90 MICROgram(s) HFA Inhaler 2 Puff(s) Inhalation every 6 hours  aspirin enteric coated 81 milliGRAM(s) Oral daily  atorvastatin 10 milliGRAM(s) Oral at bedtime  buDESOnide   0.5 milliGRAM(s) Respule 0.5 milliGRAM(s) Inhalation two times a day  clonazePAM  Tablet 0.5 milliGRAM(s) Oral daily  dextrose 5%. 1000 milliLiter(s) (50 mL/Hr) IV Continuous <Continuous>  dextrose 50% Injectable 12.5 Gram(s) IV Push once  dextrose 50% Injectable 25 Gram(s) IV Push once  dextrose 50% Injectable 25 Gram(s) IV Push once  docusate sodium 100 milliGRAM(s) Oral three times a day  heparin  Injectable 5000 Unit(s) SubCutaneous every 8 hours  insulin lispro (HumaLOG) corrective regimen sliding scale   SubCutaneous three times a day before meals  insulin lispro (HumaLOG) corrective regimen sliding scale   SubCutaneous at bedtime  lactated ringers. 1000 milliLiter(s) (30 mL/Hr) IV Continuous <Continuous>  levothyroxine 88 MICROGram(s) Oral daily  PARoxetine 20 milliGRAM(s) Oral daily  phenylephrine    Infusion 0.2 MICROgram(s)/kG/Min (5.243 mL/Hr) IV Continuous <Continuous>  senna 2 Tablet(s) Oral at bedtime  tiotropium 18 MICROgram(s) Capsule 1 Capsule(s) Inhalation daily    MEDICATIONS  (PRN):  dextrose 40% Gel 15 Gram(s) Oral once PRN Blood Glucose LESS THAN 70 milliGRAM(s)/deciliter  glucagon  Injectable 1 milliGRAM(s) IntraMuscular once PRN Glucose LESS THAN 70 milligrams/deciliter  oxyCODONE    IR 5 milliGRAM(s) Oral every 3 hours PRN Severe Pain (7 - 10)      OBJECTIVE:   [X] No new signs     [ ] Other:    Side Effects:  [X ] None			[ ] Other:      ASSESSMENT/PLAN  -Discontinue current therapy    [ ] Therapy changed to:    [ ] IV PCA       [ ] Epidural     [ X] prn Analgesics     Comments: Pain management per primary team, APS to sign off

## 2019-06-18 NOTE — DISCHARGE NOTE PROVIDER - NSDCCPCAREPLAN_GEN_ALL_CORE_FT
PRINCIPAL DISCHARGE DIAGNOSIS  Diagnosis: Pulmonary nodules  Assessment and Plan of Treatment: Pulmonary nodules

## 2019-06-18 NOTE — DISCHARGE NOTE PROVIDER - NSDCFUADDINST_GEN_ALL_CORE_FT
Walk frequently. You may resume your usual activities at Assisted Living facility.   Continue to use incentive spirometer.  You may climb stairs.  Please remove Rt. chest gauze and tape dressing tomorrow. Then you can begin to shower. Wound can remain uncovered. Suture will be removed in office.

## 2019-06-18 NOTE — PHYSICAL THERAPY INITIAL EVALUATION ADULT - RANGE OF MOTION EXAMINATION, REHAB EVAL
bilateral shoulder flexion not tested >90 degrees as per PT discretion/bilateral upper extremity ROM was WFL (within functional limits)/bilateral lower extremity ROM was WFL (within functional limits)

## 2019-06-18 NOTE — PROGRESS NOTE ADULT - ATTENDING COMMENTS
CT removed at bedside today FU CXR stable. The patient continues to require around the clock cardiopulmonary and neurologic monitoring for critical illness, as cited above. Tolerated the Vats well with no acute pain , respiratory distress or pneumothorax. May be able to be discharged later today to the [facility], if cleared for discharge by thoracic surgery.

## 2019-06-18 NOTE — DISCHARGE NOTE PROVIDER - NSDCCPTREATMENT_GEN_ALL_CORE_FT
PRINCIPAL PROCEDURE  Procedure: VATS, with lung resection  Findings and Treatment: RML wedge resection

## 2019-06-18 NOTE — DISCHARGE NOTE NURSING/CASE MANAGEMENT/SOCIAL WORK - NSDCPEEMAIL_GEN_ALL_CORE
[facility] for Tobacco Control email [email or web address]

## 2019-06-18 NOTE — PROGRESS NOTE ADULT - SUBJECTIVE AND OBJECTIVE BOX
Anesthesia Pain Management Service    SUBJECTIVE: Patient is doing well with IV PCA and no significant problems reported.    Pain Scale Score	At rest: __1_ 	With Activity: ___ 	[X ] Refer to charted pain scores    THERAPY:    [ ] IV PCA Morphine		[ ] 5 mg/mL	[ ] 1 mg/mL  [X ] IV PCA Hydromorphone	[ ] 5 mg/mL	[X ] 1 mg/mL  [ ] IV PCA Fentanyl		[ ] 50 micrograms/mL    Demand dose __0.2_ lockout __6_ (minutes) Continuous Rate _0__ Total: _0__   mg used (in past 24 hrs)      MEDICATIONS  (STANDING):  acetaminophen   Tablet .. 650 milliGRAM(s) Oral every 6 hours  ALBUTerol    90 MICROgram(s) HFA Inhaler 2 Puff(s) Inhalation every 6 hours  aspirin enteric coated 81 milliGRAM(s) Oral daily  atorvastatin 10 milliGRAM(s) Oral at bedtime  buDESOnide   0.5 milliGRAM(s) Respule 0.5 milliGRAM(s) Inhalation two times a day  clonazePAM  Tablet 0.5 milliGRAM(s) Oral daily  dextrose 5%. 1000 milliLiter(s) (50 mL/Hr) IV Continuous <Continuous>  dextrose 50% Injectable 12.5 Gram(s) IV Push once  dextrose 50% Injectable 25 Gram(s) IV Push once  dextrose 50% Injectable 25 Gram(s) IV Push once  docusate sodium 100 milliGRAM(s) Oral three times a day  heparin  Injectable 5000 Unit(s) SubCutaneous every 8 hours  insulin lispro (HumaLOG) corrective regimen sliding scale   SubCutaneous three times a day before meals  insulin lispro (HumaLOG) corrective regimen sliding scale   SubCutaneous at bedtime  lactated ringers. 1000 milliLiter(s) (30 mL/Hr) IV Continuous <Continuous>  levothyroxine 88 MICROGram(s) Oral daily  PARoxetine 20 milliGRAM(s) Oral daily  phenylephrine    Infusion 0.2 MICROgram(s)/kG/Min (5.243 mL/Hr) IV Continuous <Continuous>  senna 2 Tablet(s) Oral at bedtime  tiotropium 18 MICROgram(s) Capsule 1 Capsule(s) Inhalation daily    MEDICATIONS  (PRN):  dextrose 40% Gel 15 Gram(s) Oral once PRN Blood Glucose LESS THAN 70 milliGRAM(s)/deciliter  glucagon  Injectable 1 milliGRAM(s) IntraMuscular once PRN Glucose LESS THAN 70 milligrams/deciliter  oxyCODONE    IR 5 milliGRAM(s) Oral every 3 hours PRN Severe Pain (7 - 10)      OBJECTIVE: sitting in chair     Sedation Score:	[ X] Alert	[ ] Drowsy 	[ ] Arousable	[ ] Asleep	[ ] Unresponsive    Side Effects:	[X ] None	[ ] Nausea	[ ] Vomiting	[ ] Pruritus  		[ ] Other:    Vital Signs Last 24 Hrs  T(C): 36.8 (18 Jun 2019 08:00), Max: 36.8 (18 Jun 2019 08:00)  T(F): 98.2 (18 Jun 2019 08:00), Max: 98.2 (18 Jun 2019 08:00)  HR: 66 (18 Jun 2019 08:00) (54 - 146)  BP: 111/65 (18 Jun 2019 08:00) (82/37 - 153/60)  BP(mean): 75 (18 Jun 2019 08:00) (49 - 102)  RR: 17 (18 Jun 2019 08:00) (12 - 29)  SpO2: 100% (18 Jun 2019 08:00) (82% - 100%)    ASSESSMENT/ PLAN    Therapy to  be:	[ ] Continue   [ X] Discontinued   [X ] Change to prn Analgesics    Documentation and Verification of current medications:   [X] Done	[ ] Not done, not elligible    Comments: PRN Oral/IV opioids and/or Adjuvant medication to be ordered at this point.    Progress Note written now but Patient was seen earlier.

## 2019-06-18 NOTE — PROGRESS NOTE ADULT - SUBJECTIVE AND OBJECTIVE BOX
Patient is a 85y old  Female who presents with a chief complaint of Lung surgery (18 Jun 2019 11:42)        MEDICATIONS  (STANDING):  acetaminophen   Tablet .. 650 milliGRAM(s) Oral every 6 hours  ALBUTerol    90 MICROgram(s) HFA Inhaler 2 Puff(s) Inhalation every 6 hours  aspirin enteric coated 81 milliGRAM(s) Oral daily  atorvastatin 10 milliGRAM(s) Oral at bedtime  buDESOnide   0.5 milliGRAM(s) Respule 0.5 milliGRAM(s) Inhalation two times a day  clonazePAM  Tablet 0.5 milliGRAM(s) Oral daily  dextrose 5%. 1000 milliLiter(s) (50 mL/Hr) IV Continuous <Continuous>  dextrose 50% Injectable 12.5 Gram(s) IV Push once  dextrose 50% Injectable 25 Gram(s) IV Push once  dextrose 50% Injectable 25 Gram(s) IV Push once  docusate sodium 100 milliGRAM(s) Oral three times a day  heparin  Injectable 5000 Unit(s) SubCutaneous every 8 hours  insulin lispro (HumaLOG) corrective regimen sliding scale   SubCutaneous three times a day before meals  insulin lispro (HumaLOG) corrective regimen sliding scale   SubCutaneous at bedtime  lactated ringers. 1000 milliLiter(s) (30 mL/Hr) IV Continuous <Continuous>  levothyroxine 88 MICROGram(s) Oral daily  PARoxetine 20 milliGRAM(s) Oral daily  phenylephrine    Infusion 0.2 MICROgram(s)/kG/Min (5.243 mL/Hr) IV Continuous <Continuous>  senna 2 Tablet(s) Oral at bedtime  tiotropium 18 MICROgram(s) Capsule 1 Capsule(s) Inhalation daily    MEDICATIONS  (PRN):  dextrose 40% Gel 15 Gram(s) Oral once PRN Blood Glucose LESS THAN 70 milliGRAM(s)/deciliter  glucagon  Injectable 1 milliGRAM(s) IntraMuscular once PRN Glucose LESS THAN 70 milligrams/deciliter  oxyCODONE    IR 5 milliGRAM(s) Oral every 3 hours PRN Severe Pain (7 - 10)      Allergies    No Known Allergies    Intolerances        VITALS:   T(C): 36.7 (06-18-19 @ 12:18), Max: 36.8 (06-18-19 @ 08:00)  HR: 63 (06-18-19 @ 12:18) (59 - 73)  BP: 101/81 (06-18-19 @ 12:18) (99/72 - 135/71)  RR: 18 (06-18-19 @ 12:18) (12 - 22)  SpO2: 96% (06-18-19 @ 12:18) (94% - 100%)  Wt(kg): --    PHYSICAL EXAM:  GENERAL: NAD, well nourished and conversant  HEAD:  Atraumatic  EYES: EOM, PERRLA, conjunctiva pink and sclera white  ENT: No tonsillar erythema, exudates, or enlargement, moist mucous membranes, good dentition, no lesions  NECK: Supple, No JVD, normal thyroid, carotids with normal upstrokes and no bruits  CHEST/LUNG: Clear to auscultation bilaterally, No rales, rhonchi, wheezing, or rubs  HEART: Regular rate and rhythm, No murmurs, rubs, or gallops  ABDOMEN: Soft, nondistended, no masses, guarding, tenderness or rebound, bowel sounds present  EXTREMITIES:  2+ Peripheral Pulses, No clubbing, cyanosis, or edema. No arthritis of shoulders, elbows, hands, hips, knees, ankles, or feet. No DJD C spine, T spine, or L/S spine  LYMPH: No lymphadenopathy noted  SKIN: No rashes or lesions  NERVOUS SYSTEM:  Alert & Oriented X3, normal cognitive function. Motor Strength 5/5 right upper and right lower.  5/5 left upper and left lower extremities, DTRs 2+ intact and symmetric    LABS:        CBC Full  -  ( 18 Jun 2019 04:50 )  WBC Count : 8.79 K/uL  RBC Count : 3.66 M/uL  Hemoglobin : 11.0 g/dL  Hematocrit : 33.7 %  Platelet Count - Automated : 196 K/uL  Mean Cell Volume : 92.1 fL  Mean Cell Hemoglobin : 30.1 pg  Mean Cell Hemoglobin Concentration : 32.6 %  Auto Neutrophil # : 6.93 K/uL  Auto Lymphocyte # : 1.16 K/uL  Auto Monocyte # : 0.65 K/uL  Auto Eosinophil # : 0.00 K/uL  Auto Basophil # : 0.01 K/uL  Auto Neutrophil % : 78.8 %  Auto Lymphocyte % : 13.2 %  Auto Monocyte % : 7.4 %  Auto Eosinophil % : 0.0 %  Auto Basophil % : 0.1 %    06-18    139  |  105  |  22  ----------------------------<  194<H>  4.6   |  21<L>  |  0.84    Ca    9.3      18 Jun 2019 04:50  Phos  2.7     06-17  Mg     1.4     06-17            CAPILLARY BLOOD GLUCOSE      POCT Blood Glucose.: 128 mg/dL (18 Jun 2019 11:57)  POCT Blood Glucose.: 160 mg/dL (18 Jun 2019 07:37)  POCT Blood Glucose.: 216 mg/dL (18 Jun 2019 05:21)  POCT Blood Glucose.: 180 mg/dL (17 Jun 2019 22:02)      RADIOLOGY & ADDITIONAL TESTS:      Consultant(s):    Care Discussed with Consultants/Other Providers [ ] YES  [ ] NO Patient is a 85y old  Female who presents with a chief complaint of Lung surgery. She has a history of diabetes, hypothyroidism, anxiety/depression, pulmonary embolism, COPD, GERD, breast cancer s/p right breast lumpectomy & RT (1999), left lung cancer s/p left lobectomy (2007) was found to have a new solitary pulmonary nodule s/p right middle lobe wedge resection. Per history the patient had a routine CT scan of chest, followed by PET/CT (5/2019) which revealed a new nodule in right lung. She is now s/p right video assisted thoracoscopy and right middle wedge resection on 6/17/2019. She has no complaints post procedure.        MEDICATIONS  (STANDING):  acetaminophen   Tablet .. 650 milliGRAM(s) Oral every 6 hours  ALBUTerol    90 MICROgram(s) HFA Inhaler 2 Puff(s) Inhalation every 6 hours  aspirin enteric coated 81 milliGRAM(s) Oral daily  atorvastatin 10 milliGRAM(s) Oral at bedtime  buDESOnide   0.5 milliGRAM(s) Respule 0.5 milliGRAM(s) Inhalation two times a day  clonazePAM  Tablet 0.5 milliGRAM(s) Oral daily  dextrose 5%. 1000 milliLiter(s) (50 mL/Hr) IV Continuous <Continuous>  dextrose 50% Injectable 12.5 Gram(s) IV Push once  dextrose 50% Injectable 25 Gram(s) IV Push once  dextrose 50% Injectable 25 Gram(s) IV Push once  docusate sodium 100 milliGRAM(s) Oral three times a day  heparin  Injectable 5000 Unit(s) SubCutaneous every 8 hours  insulin lispro (HumaLOG) corrective regimen sliding scale   SubCutaneous three times a day before meals  insulin lispro (HumaLOG) corrective regimen sliding scale   SubCutaneous at bedtime  lactated ringers. 1000 milliLiter(s) (30 mL/Hr) IV Continuous <Continuous>  levothyroxine 88 MICROGram(s) Oral daily  PARoxetine 20 milliGRAM(s) Oral daily  phenylephrine    Infusion 0.2 MICROgram(s)/kG/Min (5.243 mL/Hr) IV Continuous <Continuous>  senna 2 Tablet(s) Oral at bedtime  tiotropium 18 MICROgram(s) Capsule 1 Capsule(s) Inhalation daily    MEDICATIONS  (PRN):  dextrose 40% Gel 15 Gram(s) Oral once PRN Blood Glucose LESS THAN 70 milliGRAM(s)/deciliter  glucagon  Injectable 1 milliGRAM(s) IntraMuscular once PRN Glucose LESS THAN 70 milligrams/deciliter  oxyCODONE    IR 5 milliGRAM(s) Oral every 3 hours PRN Severe Pain (7 - 10)      Allergies    No Known Allergies    Intolerances        VITALS:   T(C): 36.7 (06-18-19 @ 12:18), Max: 36.8 (06-18-19 @ 08:00)  HR: 63 (06-18-19 @ 12:18) (59 - 73)  BP: 101/81 (06-18-19 @ 12:18) (99/72 - 135/71)  RR: 18 (06-18-19 @ 12:18) (12 - 22)  SpO2: 96% (06-18-19 @ 12:18) (94% - 100%)  Wt(kg): --    PHYSICAL EXAM:  GENERAL: NAD, well nourished and conversant  HEAD:  Atraumatic  EYES: EOM, PERRLA, conjunctiva pink and sclera white  ENT: No tonsillar erythema, exudates, or enlargement, moist mucous membranes, good dentition, no lesions  NECK: Supple, No JVD, normal thyroid, carotids with normal upstrokes and no bruits  CHEST/LUNG: Clear to auscultation bilaterally, No rales, rhonchi, wheezing, or rubs  HEART: Regular rate and rhythm, No murmurs, rubs, or gallops  ABDOMEN: Soft, nondistended, no masses, guarding, tenderness or rebound, bowel sounds present  EXTREMITIES:  2+ Peripheral Pulses, No clubbing, cyanosis, or edema. No arthritis of shoulders, elbows, hands, hips, knees, ankles, or feet. No DJD C spine, T spine, or L/S spine  LYMPH: No lymphadenopathy noted  SKIN: No rashes or lesions  NERVOUS SYSTEM:  Alert & Oriented X3, normal cognitive function. Motor Strength 5/5 right upper and right lower.  5/5 left upper and left lower extremities, DTRs 2+ intact and symmetric    LABS:        CBC Full  -  ( 18 Jun 2019 04:50 )  WBC Count : 8.79 K/uL  RBC Count : 3.66 M/uL  Hemoglobin : 11.0 g/dL  Hematocrit : 33.7 %  Platelet Count - Automated : 196 K/uL  Mean Cell Volume : 92.1 fL  Mean Cell Hemoglobin : 30.1 pg  Mean Cell Hemoglobin Concentration : 32.6 %  Auto Neutrophil # : 6.93 K/uL  Auto Lymphocyte # : 1.16 K/uL  Auto Monocyte # : 0.65 K/uL  Auto Eosinophil # : 0.00 K/uL  Auto Basophil # : 0.01 K/uL  Auto Neutrophil % : 78.8 %  Auto Lymphocyte % : 13.2 %  Auto Monocyte % : 7.4 %  Auto Eosinophil % : 0.0 %  Auto Basophil % : 0.1 %    06-18    139  |  105  |  22  ----------------------------<  194<H>  4.6   |  21<L>  |  0.84    Ca    9.3      18 Jun 2019 04:50  Phos  2.7     06-17  Mg     1.4     06-17            CAPILLARY BLOOD GLUCOSE      POCT Blood Glucose.: 128 mg/dL (18 Jun 2019 11:57)  POCT Blood Glucose.: 160 mg/dL (18 Jun 2019 07:37)  POCT Blood Glucose.: 216 mg/dL (18 Jun 2019 05:21)  POCT Blood Glucose.: 180 mg/dL (17 Jun 2019 22:02)      RADIOLOGY & ADDITIONAL TESTS:      Consultant(s):    Care Discussed with Consultants/Other Providers [ ] YES  [ ] NO

## 2019-06-18 NOTE — PHYSICAL THERAPY INITIAL EVALUATION ADULT - PATIENT PROFILE REVIEW, REHAB EVAL
PT orders received: ambulate as tolerated. Consult with DON VERMA, pt may participate in PT evaluation./yes

## 2019-06-19 PROBLEM — K59.09 CONSTIPATION, CHRONIC: Status: ACTIVE | Noted: 2019-06-19

## 2019-06-19 RX ORDER — ACETAMINOPHEN 325 MG/1
325 TABLET, FILM COATED ORAL
Qty: 90 | Refills: 3 | Status: ACTIVE | OUTPATIENT

## 2019-06-22 PROBLEM — F41.9 ANXIETY DISORDER, UNSPECIFIED: Chronic | Status: ACTIVE | Noted: 2019-06-11

## 2019-06-22 LAB — BACTERIA SKIN AEROBE CULT: SIGNIFICANT CHANGE UP

## 2019-06-24 LAB — SPECIMEN SOURCE: SIGNIFICANT CHANGE UP

## 2019-06-26 LAB — SURGICAL PATHOLOGY STUDY: SIGNIFICANT CHANGE UP

## 2019-07-01 ENCOUNTER — APPOINTMENT (OUTPATIENT)
Dept: THORACIC SURGERY | Facility: CLINIC | Age: 84
End: 2019-07-01
Payer: MEDICARE

## 2019-07-01 VITALS
HEART RATE: 65 BPM | RESPIRATION RATE: 14 BRPM | TEMPERATURE: 98.2 F | SYSTOLIC BLOOD PRESSURE: 131 MMHG | HEIGHT: 60 IN | WEIGHT: 154 LBS | BODY MASS INDEX: 30.23 KG/M2 | OXYGEN SATURATION: 95 % | DIASTOLIC BLOOD PRESSURE: 82 MMHG

## 2019-07-01 DIAGNOSIS — Z09 ENCOUNTER FOR FOLLOW-UP EXAMINATION AFTER COMPLETED TREATMENT FOR CONDITIONS OTHER THAN MALIGNANT NEOPLASM: ICD-10-CM

## 2019-07-01 PROCEDURE — 99024 POSTOP FOLLOW-UP VISIT: CPT

## 2019-07-01 RX ORDER — BUDESONIDE 0.5 MG/2ML
0.5 INHALANT ORAL
Qty: 4 | Refills: 3 | Status: DISCONTINUED | OUTPATIENT
Start: 2018-12-26 | End: 2019-07-01

## 2019-07-01 RX ORDER — BUDESONIDE 0.5 MG/2ML
0.5 INHALANT ORAL
Qty: 180 | Refills: 1 | Status: DISCONTINUED | COMMUNITY
Start: 2019-02-04 | End: 2019-07-01

## 2019-07-16 LAB — FUNGUS SPEC QL CULT: SIGNIFICANT CHANGE UP

## 2019-07-17 ENCOUNTER — APPOINTMENT (OUTPATIENT)
Dept: PULMONOLOGY | Facility: CLINIC | Age: 84
End: 2019-07-17
Payer: MEDICARE

## 2019-07-17 ENCOUNTER — NON-APPOINTMENT (OUTPATIENT)
Age: 84
End: 2019-07-17

## 2019-07-17 VITALS
SYSTOLIC BLOOD PRESSURE: 130 MMHG | BODY MASS INDEX: 30.23 KG/M2 | OXYGEN SATURATION: 96 % | DIASTOLIC BLOOD PRESSURE: 80 MMHG | HEART RATE: 65 BPM | RESPIRATION RATE: 14 BRPM | WEIGHT: 154 LBS | HEIGHT: 60 IN

## 2019-07-17 PROCEDURE — 99214 OFFICE O/P EST MOD 30 MIN: CPT | Mod: 25

## 2019-07-17 PROCEDURE — 94010 BREATHING CAPACITY TEST: CPT

## 2019-07-17 NOTE — HISTORY OF PRESENT ILLNESS
[FreeTextEntry1] : Ms. Borja is a 86 year old female with a history of abnormal chest CT, COPD, lung nodules, OW, poor balance, and snoring presenting to the office today for a follow up visit s/p VATS (neuroendocrine tumorlets). His chief complaint is\par -she states that she has been more SOB since her surgery \par -she reports that she has had more hand cramping\par -she notes that she has not been walking regularly. her weight has been stable. her blood sugar has been elevated , she has been eating a lot of sweets \par -she denies any headaches, nausea, vomiting, fever, chills, sweats, chest pain, chest pressure, diarrhea, constipation, dysphagia, dizziness, leg swelling, leg pain, itchy eyes, itchy ears, heartburn, reflux, or sour taste in the mouth.

## 2019-07-17 NOTE — ADDENDUM
[FreeTextEntry1] : All medical record entries made by irasema Garcia were at Dr. Misael Joel's, direction and personally dictated by me on 07/17/2019. I have reviewed the chart and agree that the record accurately reflects my personal performance of the history, physical exam, assessment and plan. I have also personally directed, reviewed, and agree with the discharge instructions.

## 2019-07-17 NOTE — ASSESSMENT
[FreeTextEntry1] : Ms. Borja is a 85 year old female with a prior history of  breast CA 1999 lung cancer 2007, overweight, diabetes, hyperthyroidism, tremor, anxiety, COPD, diverticulosis, GERD who now comes in for a pulmonary re- evaluation. Her number one issue is her blood sugar, though she is improved overall - s/p VATS biopsy - Neuroendocrine tumorlets.  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 \par -s/p CTS evaluation - Dr. Blair Kline \par -get oncology evaluation - Dr. Ling \par -s/p blood work to include full rheumatologic panel - hypersensitivity panel , ESR level, ACE level  (negative)\par -get follow up chest CT - 10/19\par \par Problem 2: COPD/ Asthma\par - Continue DuoNeb via nebulizer BID\par - Continue Pulmicort 0.5 mg via nebulizer BID\par - Continue Trelegy 1 inhalation QD\par Asthma is believed to be caused by inherited (genetic) and environmental factor, but its exact cause is unknown. Asthma may be triggered by allergens, lung infections, or irritants in the air. Asthma triggers are different for each person \par -COPD is a progressive disease and although it can’t be cured , appropriate management can slow its progression, reduce frequency and severity of exacerbations, and improve symptoms and the patient quality of life. Hospitalizations are the greatest contributor to the total COPD costs and account for up to 87% of total COPD related costs. Exacerbations are the main cause of admissions and subsequently account for the 40-75% of COPD costs. Inhaled maintenance therapy reduces the incidence of exacerbations in patients with stable COPD. Incorrect inhaler use and nonadherence are major obstacles to achieving COPD treatment goals. Many COPD patients have challenges (impaired inhalation, limited dexterity, reduced cognition: that limit their ability to correctly use their COPD treatment devices resulting in reduced symptom control. Of most importance is smoking cessation and early intervention with respiratory illnesses and contemplation for pulmonary rehab to enhance quality of life.\par -Inhaler technique reviewed as well as oral hygiene techniques reviewed with patient. Avoidance of cold air, extremes of temperature, rescue inhaler should be used before exercise. Order of medication reviewed with patient. Recommended use of a cool mist humidifier in the bedroom. \par \par Problem 3: GERD \par - Continue omeprazole 20 mg before breakfat\par Things to avoid including overeating, spicy foods, tight clothing, eating within three hours of bed, this list is not all inclusive. \par -For treatment of reflux, possible options discussed including diet control, H2 blockers, PPIs, as well as coating motility agents discussed as treatment options. Timing of meals and proximity of last meal to sleep were discussed. If symptoms persist, a formal gastrointestinal evaluation is needed.\par \par Problem 4: poor balance\par - Recommended balance therapy (Gait Training)\par \par Problem 5: ? OSAS\par - Due to her snoring, elevated Mallampati class, and EDS, she is being recommended for a home sleep study to discern for sleep apnea \par Sleep apnea is associated with adverse clinical consequences which an affect most organ systems. Cardiovascular disease risk includes arrhythmias, atrial fibrillation, hypertension, coronary artery disease, and stroke. Metabolic disorders include diabetes type 2, non-alcoholic fatty liver disease. Mood disorder especially depression; and cognitive decline especially in the elderly. Associations with chronic reflux/Sullivan’s esophagus some but not all inclusive. \par -Reasons include arousal consistent with hypopnea; respiratory events most prominent in REM sleep or supine position; therefore sleep staging and body position are important for accurate diagnosis and estimation of AHI. \par \par Problem 6: Overweight \par - Weight loss, exercise, and diet control were discussed and are highly encouraged. Treatment options were given such as, aqua therapy, and contacting a nutritionist. Recommended to use the elliptical, stationary bike, less use of treadmill. Mindful eating was explained to the patient Obesity is associated with worsening asthma, shortness of breath, and potential for cardiac disease, diabetes, and other underlying medical conditions. \par \par Problem 7: Poor breathing\par Proper breathing techniques were reviewed with an emphasis of exhalation. Patient instructed to breath in for 1 second and out for four seconds. Patient was encouraged to not talk while walking. \par \par problem 8: preoperative pulmonary clearance\par -at this point in time there are no absolute pulmonary contraindications to go forward with the planned procedure \par -at the time of surgery s/he should have optimal pain control, incentive spirometry, early ambulation, DVT and GI prophylaxis. \par \par Problem 9:health maintenance \par -PCP: Dr. Junior Frias\par -s/p flu shot 2018\par -recommended strep pneumonia vaccines: Prevnar-13 vaccine, followed by Pneumo vaccine 23 one year following\par -recommended early intervention for URIs\par -recommended regular osteoporosis evaluations\par -recommended early dermatological evaluations\par -recommended after the age of 50 to the age of 70, colonoscopy every 5 years \par \par f/u in 3-4 months \par pt is encouraged to call or fax the office with any questions or concerns. \par Explained to the pt in full detail with demonstrations how to use the inhalers and inhaler hygiene. \par -Education provided to the PT regarding their visit and conditions.

## 2019-07-17 NOTE — PROCEDURE
[FreeTextEntry1] : PFT - spi reveals normal flows; FEV1 is 1.09 which is 80% of predicted, normal flow volume loop \par \par Chest CT (May 14, 2019) reveals multiple new b/l dense and irregular pulmonary opacities. Similar-appearing smaller areas were seen on past exams which were metabolically active on PET/CT scan. However, on current exam the opacities are larger than previous exams. DDx includes metastatic disease with inflammatory opacities. \par

## 2019-07-29 LAB — ACID FAST STN SPEC: SIGNIFICANT CHANGE UP

## 2019-09-05 ENCOUNTER — RX RENEWAL (OUTPATIENT)
Age: 84
End: 2019-09-05

## 2019-10-24 ENCOUNTER — APPOINTMENT (OUTPATIENT)
Dept: PULMONOLOGY | Facility: CLINIC | Age: 84
End: 2019-10-24
Payer: MEDICARE

## 2019-10-24 ENCOUNTER — NON-APPOINTMENT (OUTPATIENT)
Age: 84
End: 2019-10-24

## 2019-10-24 PROCEDURE — 94010 BREATHING CAPACITY TEST: CPT

## 2019-10-24 PROCEDURE — 99214 OFFICE O/P EST MOD 30 MIN: CPT | Mod: 25

## 2019-10-24 NOTE — HISTORY OF PRESENT ILLNESS
[FreeTextEntry1] : Ms. Borja is a 86 year old female with a history of abnormal chest CT, COPD, lung nodules, OW, poor balance, and snoring presenting to the office today for a follow up visit s/p VATS (neuroendocrine tumorlets). His chief complaint is sinus issues / weight gain.\par -she is s/p lysis of adhesions with Dr. Rogers on 9/12/2019 at [facility]\par -she states that she has now been feeling well\par -she reports that her swallowing issues have resolved\par -her ears have been itchy\par -she reports a persistent PND during the day and has been using nasal saline at night before she goes to sleep\par -she reports some right leg swelling\par -she has not been using any new medications / supplements \par -she has been using both of her nebulizer medications BID each\par -she states that she has recently gained about 10 lbs as she has been eating a lot \par -she denies any headaches, nausea, vomiting, fever, chills, sweats, chest pain, chest pressure, diarrhea, constipation, dysphagia, dizziness, leg pain, itchy eyes, heartburn, reflux, or sour taste in the mouth, wheeze.

## 2019-10-24 NOTE — PROCEDURE
[FreeTextEntry1] : PFT - spi reveals normal flows; FEV1 is 1.17 which is 86% of predicted, normal flow volume loop \par \par US Thyroid (August 6, 20190 reveals highly suspicious nodle, right mid thyroid gland. No overall significant change when compared to 5/16/2017 study. Fine-needle aspiration biopsy is recommended\par \par Blood work 10/17/2019) reveals:\par Glucose 154\par HDL 35\par Triglycerides 187\par CBC:\par WBC 6.8\par HGB 12.1\par \par Eosinophil 1.6% \par HgBA1c 6.5\par Vitamin D wnl

## 2019-10-24 NOTE — ADDENDUM
[FreeTextEntry1] : All medical record entries made by irasema Garcia were at Dr. Misael Joel's direction and personally dictated by me on 10/24/2019. I have reviewed the chart and agree that the record accurately reflects my personal performance of the history, physical exam, assessment and plan. I have also personally directed, reviewed, and agree with the discharge instructions.

## 2019-10-24 NOTE — ASSESSMENT
[FreeTextEntry1] : Ms. Borja is a 85 year old female with a prior history of  breast CA 1999 lung cancer 2007, overweight, diabetes, hyperthyroidism, tremor, anxiety, COPD, diverticulosis, GERD who now comes in for a pulmonary re- evaluation. Her number one issue is her blood sugar, though she is improved overall - s/p VATS biopsy - Neuroendocrine tumorlets. She is currently stable from a pulmonary perspective. \par \par The patient's SOB is felt to be multifactorial:\par - Overweight / Out-of Shape\par - Poor breathing mechanics (could be secondary to poor balance or anxiety)\par - Restrictive Lung Disease- Lobectomy with loss of volume and overweight \par - Obstructive Lung Disease: Asthma/ COPD\par - Cardiac Disease \par \par Problem 1: Abnormal CT Chest -(c/w neuroendocrine tumorlets 6/19)\par -she is s/p a Quantiferon Gold test, which was negative. \par -s/p CTS evaluation - Dr. Blair Kline \par -get oncology evaluation - Dr. Ling \par -s/p blood work to include full rheumatologic panel - hypersensitivity panel , ESR level, ACE level  (negative)\par -get follow up chest CT - 12/2019 (or PET/CT)\par \par Problem 2: COPD / Asthma\par - Continue DuoNeb via nebulizer BID\par - Continue Pulmicort 0.5% via nebulizer BID\par - Continue Trelegy 1 inhalation QD\par Asthma is believed to be caused by inherited (genetic) and environmental factor, but its exact cause is unknown. Asthma may be triggered by allergens, lung infections, or irritants in the air. Asthma triggers are different for each person \par -COPD is a progressive disease and although it can’t be cured , appropriate management can slow its progression, reduce frequency and severity of exacerbations, and improve symptoms and the patient quality of life. Hospitalizations are the greatest contributor to the total COPD costs and account for up to 87% of total COPD related costs. Exacerbations are the main cause of admissions and subsequently account for the 40-75% of COPD costs. Inhaled maintenance therapy reduces the incidence of exacerbations in patients with stable COPD. Incorrect inhaler use and nonadherence are major obstacles to achieving COPD treatment goals. Many COPD patients have challenges (impaired inhalation, limited dexterity, reduced cognition: that limit their ability to correctly use their COPD treatment devices resulting in reduced symptom control. Of most importance is smoking cessation and early intervention with respiratory illnesses and contemplation for pulmonary rehab to enhance quality of life.\par -Inhaler technique reviewed as well as oral hygiene techniques reviewed with patient. Avoidance of cold air, extremes of temperature, rescue inhaler should be used before exercise. Order of medication reviewed with patient. Recommended use of a cool mist humidifier in the bedroom. \par \par Problem 3: GERD \par -continue Omeprazole 20 mg pre-breakfast\par Things to avoid including overeating, spicy foods, tight clothing, eating within three hours of bed, this list is not all inclusive. \par -For treatment of reflux, possible options discussed including diet control, H2 blockers, PPIs, as well as coating motility agents discussed as treatment options. Timing of meals and proximity of last meal to sleep were discussed. If symptoms persist, a formal gastrointestinal evaluation is needed.\par \par Problem 4: poor balance\par -Recommended balance therapy (Gait Training)\par \par Problem 5: ? OSAS\par -Due to her snoring, elevated Mallampati class, and EDS, she is being recommended for a home sleep study to discern for sleep apnea \par Sleep apnea is associated with adverse clinical consequences which an affect most organ systems. Cardiovascular disease risk includes arrhythmias, atrial fibrillation, hypertension, coronary artery disease, and stroke. Metabolic disorders include diabetes type 2, non-alcoholic fatty liver disease. Mood disorder especially depression; and cognitive decline especially in the elderly. Associations with chronic reflux/Sullivan’s esophagus some but not all inclusive. \par -Reasons include arousal consistent with hypopnea; respiratory events most prominent in REM sleep or supine position; therefore sleep staging and body position are important for accurate diagnosis and estimation of AHI. \par \par Problem 6: Overweight \par - Weight loss, exercise, and diet control were discussed and are highly encouraged. Treatment options were given such as, aqua therapy, and contacting a nutritionist. Recommended to use the elliptical, stationary bike, less use of treadmill. Mindful eating was explained to the patient Obesity is associated with worsening asthma, shortness of breath, and potential for cardiac disease, diabetes, and other underlying medical conditions. \par \par Problem 7: Poor breathing\par Proper breathing techniques were reviewed with an emphasis of exhalation. Patient instructed to breath in for 1 second and out for four seconds. Patient was encouraged to not talk while walking. \par \par problem 8: preoperative pulmonary clearance\par -at this point in time there are no absolute pulmonary contraindications to go forward with the planned procedure \par -at the time of surgery s/he should have optimal pain control, incentive spirometry, early ambulation, DVT and GI prophylaxis. \par \par Problem 9:health maintenance \par -PCP: Dr. Junior Frias\par -s/p flu shot 2019\par -recommended strep pneumonia vaccines: Prevnar-13 vaccine, followed by Pneumo vaccine 23 one year following\par -recommended early intervention for URIs\par -recommended regular osteoporosis evaluations\par -recommended early dermatological evaluations\par -recommended after the age of 50 to the age of 70, colonoscopy every 5 years \par \par F/U in 3-4 months \par pt is encouraged to call or fax the office with any questions or concerns. \par Explained to the pt in full detail with demonstrations how to use the inhalers and inhaler hygiene. \par -Education provided to the PT regarding their visit and conditions.

## 2019-10-24 NOTE — PHYSICAL EXAM
[General Appearance - Well Developed] : well developed [Normal Appearance] : normal appearance [Well Groomed] : well groomed [General Appearance - Well Nourished] : well nourished [No Deformities] : no deformities [Normal Conjunctiva] : the conjunctiva exhibited no abnormalities [General Appearance - In No Acute Distress] : no acute distress [Eyelids - No Xanthelasma] : the eyelids demonstrated no xanthelasmas [Normal Oropharynx] : normal oropharynx [Neck Appearance] : the appearance of the neck was normal [Neck Cervical Mass (___cm)] : no neck mass was observed [Jugular Venous Distention Increased] : there was no jugular-venous distention [Thyroid Diffuse Enlargement] : the thyroid was not enlarged [Thyroid Nodule] : there were no palpable thyroid nodules [Heart Rate And Rhythm] : heart rate and rhythm were normal [Heart Sounds] : normal S1 and S2 [Murmurs] : no murmurs present [Exaggerated Use Of Accessory Muscles For Inspiration] : no accessory muscle use [Respiration, Rhythm And Depth] : normal respiratory rhythm and effort [Abdomen Soft] : soft [Abdomen Tenderness] : non-tender [Abdomen Mass (___ Cm)] : no abdominal mass palpated [Gait - Sufficient For Exercise Testing] : the gait was sufficient for exercise testing [Abnormal Walk] : normal gait [Nail Clubbing] : no clubbing of the fingernails [Cyanosis, Localized] : no localized cyanosis [Petechial Hemorrhages (___cm)] : no petechial hemorrhages [Skin Color & Pigmentation] : normal skin color and pigmentation [] : no rash [No Venous Stasis] : no venous stasis [No Skin Ulcers] : no skin ulcer [Skin Lesions] : no skin lesions [No Xanthoma] : no  xanthoma was observed [Deep Tendon Reflexes (DTR)] : deep tendon reflexes were 2+ and symmetric [Sensation] : the sensory exam was normal to light touch and pinprick [No Focal Deficits] : no focal deficits [Oriented To Time, Place, And Person] : oriented to person, place, and time [Impaired Insight] : insight and judgment were intact [Affect] : the affect was normal [III] : III [FreeTextEntry1] : I:E ratio 1:3; faint end expiratory wheeze [FreeTextEntry2] : 1+ lower extremity edema.

## 2019-10-30 ENCOUNTER — MEDICATION RENEWAL (OUTPATIENT)
Age: 84
End: 2019-10-30

## 2019-11-07 ENCOUNTER — APPOINTMENT (OUTPATIENT)
Dept: OPHTHALMOLOGY | Facility: CLINIC | Age: 84
End: 2019-11-07
Payer: MEDICARE

## 2019-11-07 ENCOUNTER — NON-APPOINTMENT (OUTPATIENT)
Age: 84
End: 2019-11-07

## 2019-11-07 PROCEDURE — 92004 COMPRE OPH EXAM NEW PT 1/>: CPT

## 2019-11-07 PROCEDURE — 92134 CPTRZ OPH DX IMG PST SGM RTA: CPT

## 2019-11-07 PROCEDURE — 92015 DETERMINE REFRACTIVE STATE: CPT

## 2019-12-12 ENCOUNTER — MEDICATION RENEWAL (OUTPATIENT)
Age: 84
End: 2019-12-12

## 2020-01-23 ENCOUNTER — TRANSCRIPTION ENCOUNTER (OUTPATIENT)
Age: 85
End: 2020-01-23

## 2020-02-21 ENCOUNTER — APPOINTMENT (OUTPATIENT)
Dept: PULMONOLOGY | Facility: CLINIC | Age: 85
End: 2020-02-21
Payer: MEDICARE

## 2020-02-21 ENCOUNTER — NON-APPOINTMENT (OUTPATIENT)
Age: 85
End: 2020-02-21

## 2020-02-21 VITALS
WEIGHT: 157 LBS | HEIGHT: 60 IN | RESPIRATION RATE: 17 BRPM | HEART RATE: 71 BPM | BODY MASS INDEX: 30.82 KG/M2 | SYSTOLIC BLOOD PRESSURE: 116 MMHG | DIASTOLIC BLOOD PRESSURE: 80 MMHG | OXYGEN SATURATION: 98 %

## 2020-02-21 DIAGNOSIS — R26.89 OTHER ABNORMALITIES OF GAIT AND MOBILITY: ICD-10-CM

## 2020-02-21 PROCEDURE — 94010 BREATHING CAPACITY TEST: CPT

## 2020-02-21 PROCEDURE — 99214 OFFICE O/P EST MOD 30 MIN: CPT | Mod: 25

## 2020-02-21 NOTE — ADDENDUM
[FreeTextEntry1] : Documented by Elizabeth Medellin acting as a scribe for Dr. Misael Joel on (02/21/2020).\par \par All medical record entries made by the Scribe were at my, Dr. Misael Joel's, direction and personally dictated by me on (02/21/2020). I have reviewed the chart and agree that the record accurately reflects my personal performance of the history, physical exam, assessment and plan. I have also personally directed, reviewed, and agree with the discharge instructions. \par \par \par

## 2020-02-21 NOTE — HISTORY OF PRESENT ILLNESS
[FreeTextEntry1] : Ms. Borja is a 86 year old female with a history of abnormal chest CT, COPD, lung nodules, OW, poor balance, and snoring presenting to the office today for a follow up visit s/p VATS (neuroendocrine tumorlets). His chief complaint is \par - She is coughing a lot \par - She feels like she is cold a lot of the time\par - She notes constipation\par - She is sleeping well \par - She is not having any visual issues \par - She recently gained 8 pounds \par - She recently went to an ENT \par - Her mouth is very dry \par - She starting coughing 3 months ago \par - She is using her inhalers as prescribed \par - She is SOB\par - denies any headaches, nausea, vomiting, fever, chills, sweats, chest pain, chest pressure, diarrhea, dysphagia, dizziness, leg swelling, leg pain, itchy eyes, itchy ears, heartburn, reflux, or sour taste in the mouth.\par \par

## 2020-02-21 NOTE — REASON FOR VISIT
[Follow-Up] : a follow-up visit [FreeTextEntry1] : s/p VATS (c/w neuroendocrine tumorlets), SOB, COPD

## 2020-02-21 NOTE — PROCEDURE
[FreeTextEntry1] : PFT revealed normal flows, with a FEV1 of 1.15L, which is 85% of predicted, with a normal flow volume loop

## 2020-02-21 NOTE — PHYSICAL EXAM
[Normal Appearance] : normal appearance [General Appearance - Well Developed] : well developed [Well Groomed] : well groomed [General Appearance - Well Nourished] : well nourished [No Deformities] : no deformities [Eyelids - No Xanthelasma] : the eyelids demonstrated no xanthelasmas [General Appearance - In No Acute Distress] : no acute distress [Normal Conjunctiva] : the conjunctiva exhibited no abnormalities [Normal Oropharynx] : normal oropharynx [III] : III [Jugular Venous Distention Increased] : there was no jugular-venous distention [Neck Cervical Mass (___cm)] : no neck mass was observed [Neck Appearance] : the appearance of the neck was normal [Thyroid Diffuse Enlargement] : the thyroid was not enlarged [Thyroid Nodule] : there were no palpable thyroid nodules [Heart Rate And Rhythm] : heart rate and rhythm were normal [Murmurs] : no murmurs present [Heart Sounds] : normal S1 and S2 [Exaggerated Use Of Accessory Muscles For Inspiration] : no accessory muscle use [Respiration, Rhythm And Depth] : normal respiratory rhythm and effort [Abdomen Tenderness] : non-tender [Abdomen Soft] : soft [Abnormal Walk] : normal gait [Abdomen Mass (___ Cm)] : no abdominal mass palpated [Gait - Sufficient For Exercise Testing] : the gait was sufficient for exercise testing [Nail Clubbing] : no clubbing of the fingernails [Cyanosis, Localized] : no localized cyanosis [Petechial Hemorrhages (___cm)] : no petechial hemorrhages [Skin Color & Pigmentation] : normal skin color and pigmentation [] : no rash [No Venous Stasis] : no venous stasis [No Xanthoma] : no  xanthoma was observed [Skin Lesions] : no skin lesions [No Skin Ulcers] : no skin ulcer [No Focal Deficits] : no focal deficits [Sensation] : the sensory exam was normal to light touch and pinprick [Deep Tendon Reflexes (DTR)] : deep tendon reflexes were 2+ and symmetric [Affect] : the affect was normal [Impaired Insight] : insight and judgment were intact [Oriented To Time, Place, And Person] : oriented to person, place, and time [FreeTextEntry1] : I:E ratio 1:3; mild expiratory wheezes bilaterally  [FreeTextEntry2] : 1+ lower extremity edema.

## 2020-02-21 NOTE — ASSESSMENT
[FreeTextEntry1] : Ms. Borja is a 86 year old female with a prior history of  breast CA 1999 lung cancer 2007, overweight, diabetes, hyperthyroidism, tremor, anxiety, COPD, diverticulosis, GERD who now comes in for a pulmonary re- evaluation. Her number one issue is her blood sugar, though she is improved overall - s/p VATS biopsy - Neuroendocrine tumorlets. She is currently stable from a pulmonary perspective except cough. \par \par The patient's SOB is felt to be multifactorial:\par - Overweight / Out-of Shape\par - Poor breathing mechanics (could be secondary to poor balance or anxiety)\par - Restrictive Lung Disease- Lobectomy with loss of volume and overweight \par - Obstructive Lung Disease: Asthma/ COPD/?TBM\par - Cardiac Disease \par \par Problem 1: Abnormal CT Chest -(c/w neuroendocrine tumorlets 6/19)\par -she is s/p a Quantiferon Gold test, which was negative. \par -s/p CTS evaluation - Dr. Blair Kline \par -get oncology evaluation - Dr. Ling \par -s/p blood work to include full rheumatologic panel - hypersensitivity panel , ESR level, ACE level  (negative)\par -get follow up chest CT - 8/2020\par \par Problem 2: COPD / Asthma\par - add Singulair 10 mg QHS\par - Continue DuoNeb via nebulizer BID\par - Continue Pulmicort 0.5% via nebulizer BID\par - Continue Trelegy 1 inhalation QD\par Asthma is believed to be caused by inherited (genetic) and environmental factor, but its exact cause is unknown. Asthma may be triggered by allergens, lung infections, or irritants in the air. Asthma triggers are different for each person \par -COPD is a progressive disease and although it can’t be cured , appropriate management can slow its progression, reduce frequency and severity of exacerbations, and improve symptoms and the patient quality of life. Hospitalizations are the greatest contributor to the total COPD costs and account for up to 87% of total COPD related costs. Exacerbations are the main cause of admissions and subsequently account for the 40-75% of COPD costs. Inhaled maintenance therapy reduces the incidence of exacerbations in patients with stable COPD. Incorrect inhaler use and nonadherence are major obstacles to achieving COPD treatment goals. Many COPD patients have challenges (impaired inhalation, limited dexterity, reduced cognition: that limit their ability to correctly use their COPD treatment devices resulting in reduced symptom control. Of most importance is smoking cessation and early intervention with respiratory illnesses and contemplation for pulmonary rehab to enhance quality of life.\par -Inhaler technique reviewed as well as oral hygiene techniques reviewed with patient. Avoidance of cold air, extremes of temperature, rescue inhaler should be used before exercise. Order of medication reviewed with patient. Recommended use of a cool mist humidifier in the bedroom. 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 Tracheomalacia can be asymptomatic, however signs or symptoms can develop as the severity of the airway narrowing progresses with major symptoms include dyspnea, cough, and sputum retention. Other symptoms include severe paroxysms of coughing, wheezing or stridor, barking cough and may be exacerbated by forced expiration, cough, and valsalva maneuver. Tracheomalacia is diagnosed by a bronchoscopic visualization of dynamic airway collapse on dynamic chest CT. Therapy is warranted in symptomatic patients with severe tracheomalacia and includes surgical repair as tracheobronchoplasty. The patient was referred to Dr. Shamar Gan or Dr. Orlin Carver, at [facility] for a surgical consult. \par \par Problem 3: GERD \par -continue Omeprazole 20 mg pre-breakfast\par Things to avoid including overeating, spicy foods, tight clothing, eating within three hours of bed, this list is not all inclusive. \par -For treatment of reflux, possible options discussed including diet control, H2 blockers, PPIs, as well as coating motility agents discussed as treatment options. Timing of meals and proximity of last meal to sleep were discussed. If symptoms persist, a formal gastrointestinal evaluation is needed.\par \par Problem 4: poor balance\par -Recommended balance therapy (Gait Training)\par \par Problem 5: ? OSAS\par -Due to her snoring, elevated Mallampati class, and EDS, she is being recommended for a home sleep study to discern for sleep apnea \par Sleep apnea is associated with adverse clinical consequences which an affect most organ systems. Cardiovascular disease risk includes arrhythmias, atrial fibrillation, hypertension, coronary artery disease, and stroke. Metabolic disorders include diabetes type 2, non-alcoholic fatty liver disease. Mood disorder especially depression; and cognitive decline especially in the elderly. Associations with chronic reflux/Sullivan’s esophagus some but not all inclusive. \par -Reasons include arousal consistent with hypopnea; respiratory events most prominent in REM sleep or supine position; therefore sleep staging and body position are important for accurate diagnosis and estimation of AHI. \par \par Problem 6: Overweight \par - Weight loss, exercise, and diet control were discussed and are highly encouraged. Treatment options were given such as, aqua therapy, and contacting a nutritionist. Recommended to use the elliptical, stationary bike, less use of treadmill. Mindful eating was explained to the patient Obesity is associated with worsening asthma, shortness of breath, and potential for cardiac disease, diabetes, and other underlying medical conditions. \par \par Problem 7: Poor breathing\par Proper breathing techniques were reviewed with an emphasis of exhalation. Patient instructed to breath in for 1 second and out for four seconds. Patient was encouraged to not talk while walking. \par \par problem 8: preoperative pulmonary clearance\par -at this point in time there are no absolute pulmonary contraindications to go forward with the planned procedure \par -at the time of surgery s/he should have optimal pain control, incentive spirometry, early ambulation, DVT and GI prophylaxis. \par \par Problem 9:health maintenance \par -PCP: Dr. Junior Frias\par -s/p flu shot 2019\par -recommended strep pneumonia vaccines: Prevnar-13 vaccine, followed by Pneumo vaccine 23 one year following (completed)\par -recommended early intervention for URIs\par -recommended regular osteoporosis evaluations\par -recommended early dermatological evaluations\par -recommended after the age of 50 to the age of 70, colonoscopy every 5 years \par \par F/U in 3-4 months \par pt is encouraged to call or fax the office with any questions or concerns. \par Explained to the pt in full detail with demonstrations how to use the inhalers and inhaler hygiene. \par -Education provided to the PT regarding their visit and conditions.

## 2020-05-11 ENCOUNTER — RX RENEWAL (OUTPATIENT)
Age: 85
End: 2020-05-11

## 2020-06-23 ENCOUNTER — APPOINTMENT (OUTPATIENT)
Dept: PULMONOLOGY | Facility: CLINIC | Age: 85
End: 2020-06-23
Payer: MEDICARE

## 2020-06-23 VITALS
DIASTOLIC BLOOD PRESSURE: 78 MMHG | OXYGEN SATURATION: 95 % | HEART RATE: 69 BPM | BODY MASS INDEX: 31.8 KG/M2 | RESPIRATION RATE: 17 BRPM | SYSTOLIC BLOOD PRESSURE: 124 MMHG | WEIGHT: 162 LBS | HEIGHT: 60 IN | TEMPERATURE: 97.8 F

## 2020-06-23 DIAGNOSIS — Z87.19 PERSONAL HISTORY OF OTHER DISEASES OF THE DIGESTIVE SYSTEM: ICD-10-CM

## 2020-06-23 PROCEDURE — 99214 OFFICE O/P EST MOD 30 MIN: CPT

## 2020-06-23 NOTE — ASSESSMENT
[FreeTextEntry1] : Ms. Borja is a 86 year old female with a prior history of  breast CA 1999 lung cancer 2007, overweight, diabetes, hyperthyroidism, tremor, anxiety, COPD, diverticulosis, GERD who now comes in for a pulmonary re- evaluation. Her number one issue is her blood sugar, though she is improved overall - s/p VATS biopsy - Neuroendocrine tumorlets. She is currently stable from a pulmonary perspective. \par \par The patient's SOB is felt to be multifactorial:\par - Overweight / Out-of Shape\par - Poor breathing mechanics (could be secondary to poor balance or anxiety)\par - Restrictive Lung Disease- Lobectomy with loss of volume and overweight \par - Obstructive Lung Disease: Asthma/ COPD/?TBM\par - Cardiac Disease \par \par Problem 1: Abnormal CT Chest -(c/w neuroendocrine tumorlets 6/19) - slight progress\par -she is s/p a Quantiferon Gold test, which was negative. \par -s/p CTS evaluation - Dr. Blair Kline \par -oncology evaluation - Dr. Ling \par -s/p blood work to include full rheumatologic panel - hypersensitivity panel , ESR level, ACE level  (negative)\par -follow up chest CT - 11/2020\par \par Problem 2: COPD / Asthma\par - continue Singulair 10 mg QHS\par - Continue DuoNeb via nebulizer BID\par - Continue Pulmicort 0.5% via nebulizer BID\par - Continue Trelegy 1 inhalation QD\par Asthma is believed to be caused by inherited (genetic) and environmental factor, but its exact cause is unknown. Asthma may be triggered by allergens, lung infections, or irritants in the air. Asthma triggers are different for each person \par -COPD is a progressive disease and although it can’t be cured , appropriate management can slow its progression, reduce frequency and severity of exacerbations, and improve symptoms and the patient quality of life. Hospitalizations are the greatest contributor to the total COPD costs and account for up to 87% of total COPD related costs. Exacerbations are the main cause of admissions and subsequently account for the 40-75% of COPD costs. Inhaled maintenance therapy reduces the incidence of exacerbations in patients with stable COPD. Incorrect inhaler use and nonadherence are major obstacles to achieving COPD treatment goals. Many COPD patients have challenges (impaired inhalation, limited dexterity, reduced cognition: that limit their ability to correctly use their COPD treatment devices resulting in reduced symptom control. Of most importance is smoking cessation and early intervention with respiratory illnesses and contemplation for pulmonary rehab to enhance quality of life.\par -Inhaler technique reviewed as well as oral hygiene techniques reviewed with patient. Avoidance of cold air, extremes of temperature, rescue inhaler should be used before exercise. Order of medication reviewed with patient. Recommended use of a cool mist humidifier in the bedroom. 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 Tracheomalacia can be asymptomatic, however signs or symptoms can develop as the severity of the airway narrowing progresses with major symptoms include dyspnea, cough, and sputum retention. Other symptoms include severe paroxysms of coughing, wheezing or stridor, barking cough and may be exacerbated by forced expiration, cough, and valsalva maneuver. Tracheomalacia is diagnosed by a bronchoscopic visualization of dynamic airway collapse on dynamic chest CT. Therapy is warranted in symptomatic patients with severe tracheomalacia and includes surgical repair as tracheobronchoplasty. The patient was referred to Dr. Shamar Gan or Dr. Orlin Carver, at [facility] for a surgical consult. \par \par Problem 3: GERD \par -continue Omeprazole 20 mg pre-breakfast\par Things to avoid including overeating, spicy foods, tight clothing, eating within three hours of bed, this list is not all inclusive. \par -For treatment of reflux, possible options discussed including diet control, H2 blockers, PPIs, as well as coating motility agents discussed as treatment options. Timing of meals and proximity of last meal to sleep were discussed. If symptoms persist, a formal gastrointestinal evaluation is needed.\par \par Problem 4: poor balance\par -Recommended balance therapy (Gait Training)\par \par Problem 5: ? OSAS\par -Due to her snoring, elevated Mallampati class, and EDS, she is being recommended for a home sleep study to discern for sleep apnea \par Sleep apnea is associated with adverse clinical consequences which an affect most organ systems. Cardiovascular disease risk includes arrhythmias, atrial fibrillation, hypertension, coronary artery disease, and stroke. Metabolic disorders include diabetes type 2, non-alcoholic fatty liver disease. Mood disorder especially depression; and cognitive decline especially in the elderly. Associations with chronic reflux/Sullivan’s esophagus some but not all inclusive. \par -Reasons include arousal consistent with hypopnea; respiratory events most prominent in REM sleep or supine position; therefore sleep staging and body position are important for accurate diagnosis and estimation of AHI. \par \par Problem 6: Overweight \par - Weight loss, exercise, and diet control were discussed and are highly encouraged. Treatment options were given such as, aqua therapy, and contacting a nutritionist. Recommended to use the elliptical, stationary bike, less use of treadmill. Mindful eating was explained to the patient Obesity is associated with worsening asthma, shortness of breath, and potential for cardiac disease, diabetes, and other underlying medical conditions. \par \par Problem 7: Poor breathing\par Proper breathing techniques were reviewed with an emphasis of exhalation. Patient instructed to breath in for 1 second and out for four seconds. Patient was encouraged to not talk while walking. \par \par problem 8: preoperative pulmonary clearance\par -at this point in time there are no absolute pulmonary contraindications to go forward with the planned procedure \par -at the time of surgery s/he should have optimal pain control, incentive spirometry, early ambulation, DVT and GI prophylaxis. \par \par Problem 9: Health Maintenance/COVID19 Precautions:\par - Clean your hands often. Wash your hands often with soap and water for at least 20 seconds, especially after blowing your nose, coughing, or sneezing, or having been in a public place.\par - If soap and water are not available, use a hand  that contains at least 60% alcohol.\par - To the extent possible, avoid touching high-touch surfaces in public places - elevator buttons, door handles, handrails, handshaking with people, etc. Use a tissue or your sleeve to cover your hand or finger if you must touch something.\par - Wash your hands after touching surfaces in public places.\par - Avoid touching your face, nose, eyes, etc.\par - Clean and disinfect your home to remove germs: practice routine cleaning of frequently touched surfaces (for example: tables, doorknobs, light switches, handles, desks, toilets, faucets, sinks & cell phones)\par - Avoid crowds, especially in poorly ventilated spaces. Your risk of exposure to respiratory viruses like COVID-19 may increase in crowded, closed-in settings with little air circulation if there are people in the crowd who are sick. All patients are recommended to practice social distancing and stay at least 6 feet away from others.\par - Avoid all non-essential travel including plane trips, and especially avoid embarking on cruise ships.\par -If COVID-19 is spreading in your community, take extra measures to put distance between yourself and other people to further reduce your risk of being exposed to this new virus.\par -Stay home as much as possible.\par - Consider ways of getting food brought to your house through family, social, or commercial networks\par -Be aware that the virus has been known to live in the air up to 3 hours post exposure, cardboard up to 24 hours post exposure, copper up to 4 hours post exposure, steel and plastic up to 2-3 days post exposure. Risk of transmission from these surfaces are affected by many variables.\par Immune Support Recommendations:\par -OTC Vitamin C 500mg BID \par -OTC Quercetin 250-500mg BID \par -OTC Zinc 75-100mg per day \par -OTC Melatonin 1 or 2 mg a night \par -OTC Vitamin D 1-4000mg per day \par -OTC Tonic Water 8oz per day\par Asthma and COVID19:\par You need to make sure your asthma is under control. This often requires the use of inhaled corticosteroids (and sometimes oral corticosteroids). Inhaled corticosteroids do not likely reduce your immune system’s ability to fight infections, but oral corticosteroids may. It is important to use the steps above to protect yourself to limit your exposure to any respiratory virus.\par \par Problem 10:health maintenance \par -PCP: Dr. Junior Frias\par -s/p flu shot 2019\par -recommended strep pneumonia vaccines: Prevnar-13 vaccine, followed by Pneumo vaccine 23 one year following (completed)\par -recommended early intervention for URIs\par -recommended regular osteoporosis evaluations\par -recommended early dermatological evaluations\par -recommended after the age of 50 to the age of 70, colonoscopy every 5 years \par \par F/U in 3-4 months \par pt is encouraged to call or fax the office with any questions or concerns. \par Explained to the pt in full detail with demonstrations how to use the inhalers and inhaler hygiene. \par -Education provided to the PT regarding their visit and conditions.

## 2020-06-23 NOTE — PHYSICAL EXAM
[Well Groomed] : well groomed [Normal Appearance] : normal appearance [General Appearance - Well Developed] : well developed [General Appearance - Well Nourished] : well nourished [No Deformities] : no deformities [General Appearance - In No Acute Distress] : no acute distress [Normal Conjunctiva] : the conjunctiva exhibited no abnormalities [III] : III [Normal Oropharynx] : normal oropharynx [Eyelids - No Xanthelasma] : the eyelids demonstrated no xanthelasmas [Neck Cervical Mass (___cm)] : no neck mass was observed [Neck Appearance] : the appearance of the neck was normal [Jugular Venous Distention Increased] : there was no jugular-venous distention [Thyroid Diffuse Enlargement] : the thyroid was not enlarged [Thyroid Nodule] : there were no palpable thyroid nodules [Heart Rate And Rhythm] : heart rate and rhythm were normal [Heart Sounds] : normal S1 and S2 [Murmurs] : no murmurs present [Exaggerated Use Of Accessory Muscles For Inspiration] : no accessory muscle use [Respiration, Rhythm And Depth] : normal respiratory rhythm and effort [Abdomen Soft] : soft [Abdomen Tenderness] : non-tender [Abdomen Mass (___ Cm)] : no abdominal mass palpated [Gait - Sufficient For Exercise Testing] : the gait was sufficient for exercise testing [Abnormal Walk] : normal gait [Nail Clubbing] : no clubbing of the fingernails [Petechial Hemorrhages (___cm)] : no petechial hemorrhages [Cyanosis, Localized] : no localized cyanosis [Skin Color & Pigmentation] : normal skin color and pigmentation [No Venous Stasis] : no venous stasis [] : no rash [Skin Lesions] : no skin lesions [No Skin Ulcers] : no skin ulcer [No Xanthoma] : no  xanthoma was observed [Deep Tendon Reflexes (DTR)] : deep tendon reflexes were 2+ and symmetric [Sensation] : the sensory exam was normal to light touch and pinprick [No Focal Deficits] : no focal deficits [Oriented To Time, Place, And Person] : oriented to person, place, and time [Impaired Insight] : insight and judgment were intact [Affect] : the affect was normal [FreeTextEntry1] : I:E ratio 1:3; clear  [FreeTextEntry2] : 1+ lower extremity edema.

## 2020-06-23 NOTE — PROCEDURE
[FreeTextEntry1] : CT (JUN.18.2020) IMPRESSION: multiple b/l pulmonary opacities as noted previously are largely stable, many exhibiting cylindrical and/or branching configuration that suggest mucous plugs. Associated patchy nodularity, particularly in the posterior right lower lobe, has mildly changed, with areas that have increased and others that have decreased. Stable postsurgical changes in the left lung and new likely postsurgical changes on the right. increased size and density of primarily groundglass opacity in the left upper lobe. this may be inflammatory in nature, but should be followed to asses stability/resolution. \par \par

## 2020-06-23 NOTE — HISTORY OF PRESENT ILLNESS
[FreeTextEntry1] : Ms. Borja is a 86 year old female with a history of abnormal chest CT, COPD, lung nodules, OW, poor balance, and snoring presenting to the office today for a follow up visit s/p VATS (neuroendocrine tumorlets). His chief complaint is \par - she has been feeling fine \par - her energy levels are okay\par - she has been sleeping well and getting enough sleep \par - she takes a pill for constipation \par - she has difficulty swallowing when her mouth is very dry \par - her heart burn / reflux has been controlled \par - she notes a little bit of itchy eyes / ears \par - her blood sugar has been high, she has not taken any measurement but she knows its high \par -she denies any headaches, nausea, vomiting, fever, chills, sweats, chest pain, chest pressure, diarrhea, constipation, dysphagia, dizziness, sour taste in the mouth, leg swelling, leg pain, itchy eyes, itchy ears, heartburn, reflux, myalgias or arthralgias.

## 2020-06-27 ENCOUNTER — RX RENEWAL (OUTPATIENT)
Age: 85
End: 2020-06-27

## 2020-06-29 NOTE — PATIENT PROFILE ADULT - LAST TOBACCO USE (DD-MM-YY)
Therapist called patient re: today's no show for her 1:15 appointment.  She stated she thought it was tomorrow and apologized for mix up.  We confirmed her next appointment next Fri 7/10/2020.     17-Jun-2009

## 2020-06-30 ENCOUNTER — RX RENEWAL (OUTPATIENT)
Age: 85
End: 2020-06-30

## 2020-08-18 ENCOUNTER — LABORATORY RESULT (OUTPATIENT)
Age: 85
End: 2020-08-18

## 2020-09-08 ENCOUNTER — RX RENEWAL (OUTPATIENT)
Age: 85
End: 2020-09-08

## 2020-09-13 ENCOUNTER — RX RENEWAL (OUTPATIENT)
Age: 85
End: 2020-09-13

## 2020-10-07 LAB — ACID FAST STN SPT: ABNORMAL

## 2020-10-14 ENCOUNTER — APPOINTMENT (OUTPATIENT)
Dept: PULMONOLOGY | Facility: CLINIC | Age: 85
End: 2020-10-14
Payer: MEDICARE

## 2020-10-14 VITALS
DIASTOLIC BLOOD PRESSURE: 70 MMHG | RESPIRATION RATE: 16 BRPM | SYSTOLIC BLOOD PRESSURE: 120 MMHG | HEIGHT: 60 IN | OXYGEN SATURATION: 97 % | TEMPERATURE: 97 F | HEART RATE: 80 BPM | WEIGHT: 157 LBS | BODY MASS INDEX: 30.82 KG/M2

## 2020-10-14 PROCEDURE — 99214 OFFICE O/P EST MOD 30 MIN: CPT

## 2020-10-14 NOTE — ASSESSMENT
[FreeTextEntry1] : Ms. Borja is a 86 year old female with a prior history of  breast CA 1999 lung cancer 2007, overweight, diabetes, hyperthyroidism, tremor, anxiety, COPD, diverticulosis, GERD who now comes in for a pulmonary re- evaluation. Her number one issue is her blood sugar, though she is improved overall - s/p VATS biopsy - Neuroendocrine tumorlets. She is currently stable from a pulmonary perspective. \par \par The patient's SOB is felt to be multifactorial:\par - Overweight / Out-of Shape\par - Poor breathing mechanics (could be secondary to poor balance or anxiety)\par - Restrictive Lung Disease- Lobectomy with loss of volume and overweight \par - Obstructive Lung Disease: Asthma/ COPD/?TBM\par - Cardiac Disease \par \par Problem 1: Abnormal CT Chest -(c/w neuroendocrine tumorlets 6/19) - improved. \par -she is s/p a Quantiferon Gold test, which was negative. \par -s/p CTS evaluation - Dr. Blair Kline \par -oncology evaluation - Dr. Ling \par -s/p blood work to include full rheumatologic panel - hypersensitivity panel , ESR level, ACE level  (negative)\par -follow up chest CT - 4/2020\par \par Problem 2: COPD / Asthma\par - continue Singulair 10 mg QHS\par - Continue DuoNeb via nebulizer BID\par - Continue Pulmicort 0.5% via nebulizer BID\par - Continue Trelegy 1 inhalation QD\par Asthma is believed to be caused by inherited (genetic) and environmental factor, but its exact cause is unknown. Asthma may be triggered by allergens, lung infections, or irritants in the air. Asthma triggers are different for each person \par -COPD is a progressive disease and although it can’t be cured , appropriate management can slow its progression, reduce frequency and severity of exacerbations, and improve symptoms and the patient quality of life. Hospitalizations are the greatest contributor to the total COPD costs and account for up to 87% of total COPD related costs. Exacerbations are the main cause of admissions and subsequently account for the 40-75% of COPD costs. Inhaled maintenance therapy reduces the incidence of exacerbations in patients with stable COPD. Incorrect inhaler use and nonadherence are major obstacles to achieving COPD treatment goals. Many COPD patients have challenges (impaired inhalation, limited dexterity, reduced cognition: that limit their ability to correctly use their COPD treatment devices resulting in reduced symptom control. Of most importance is smoking cessation and early intervention with respiratory illnesses and contemplation for pulmonary rehab to enhance quality of life.\par -Inhaler technique reviewed as well as oral hygiene techniques reviewed with patient. Avoidance of cold air, extremes of temperature, rescue inhaler should be used before exercise. Order of medication reviewed with patient. Recommended use of a cool mist humidifier in the bedroom. 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 Tracheomalacia can be asymptomatic, however signs or symptoms can develop as the severity of the airway narrowing progresses with major symptoms include dyspnea, cough, and sputum retention. Other symptoms include severe paroxysms of coughing, wheezing or stridor, barking cough and may be exacerbated by forced expiration, cough, and valsalva maneuver. Tracheomalacia is diagnosed by a bronchoscopic visualization of dynamic airway collapse on dynamic chest CT. Therapy is warranted in symptomatic patients with severe tracheomalacia and includes surgical repair as tracheobronchoplasty. The patient was referred to Dr. Shamar Gan or Dr. Orlin Carver, at [facility] for a surgical consult. \par \par Problem 3: GERD \par -continue Omeprazole 20 mg pre-breakfast\par Things to avoid including overeating, spicy foods, tight clothing, eating within three hours of bed, this list is not all inclusive. \par -For treatment of reflux, possible options discussed including diet control, H2 blockers, PPIs, as well as coating motility agents discussed as treatment options. Timing of meals and proximity of last meal to sleep were discussed. If symptoms persist, a formal gastrointestinal evaluation is needed.\par \par Problem 4: poor balance\par -Recommended balance therapy (Gait Training)\par \par Problem 5: ? OSAS\par -Due to her snoring, elevated Mallampati class, and EDS, she is being recommended for a home sleep study to discern for sleep apnea \par Sleep apnea is associated with adverse clinical consequences which an affect most organ systems. Cardiovascular disease risk includes arrhythmias, atrial fibrillation, hypertension, coronary artery disease, and stroke. Metabolic disorders include diabetes type 2, non-alcoholic fatty liver disease. Mood disorder especially depression; and cognitive decline especially in the elderly. Associations with chronic reflux/Sullivan’s esophagus some but not all inclusive. \par -Reasons include arousal consistent with hypopnea; respiratory events most prominent in REM sleep or supine position; therefore sleep staging and body position are important for accurate diagnosis and estimation of AHI. \par \par Problem 6: Overweight \par - Weight loss, exercise, and diet control were discussed and are highly encouraged. Treatment options were given such as, aqua therapy, and contacting a nutritionist. Recommended to use the elliptical, stationary bike, less use of treadmill. Mindful eating was explained to the patient Obesity is associated with worsening asthma, shortness of breath, and potential for cardiac disease, diabetes, and other underlying medical conditions. \par \par Problem 7: Poor breathing\par Proper breathing techniques were reviewed with an emphasis of exhalation. Patient instructed to breath in for 1 second and out for four seconds. Patient was encouraged to not talk while walking. \par \par Problem 8: Health Maintenance/COVID19 Precautions:\par - Clean your hands often. Wash your hands often with soap and water for at least 20 seconds, especially after blowing your nose, coughing, or sneezing, or having been in a public place.\par - If soap and water are not available, use a hand  that contains at least 60% alcohol.\par - To the extent possible, avoid touching high-touch surfaces in public places - elevator buttons, door handles, handrails, handshaking with people, etc. Use a tissue or your sleeve to cover your hand or finger if you must touch something.\par - Wash your hands after touching surfaces in public places.\par - Avoid touching your face, nose, eyes, etc.\par - Clean and disinfect your home to remove germs: practice routine cleaning of frequently touched surfaces (for example: tables, doorknobs, light switches, handles, desks, toilets, faucets, sinks & cell phones)\par - Avoid crowds, especially in poorly ventilated spaces. Your risk of exposure to respiratory viruses like COVID-19 may increase in crowded, closed-in settings with little air circulation if there are people in the crowd who are sick. All patients are recommended to practice social distancing and stay at least 6 feet away from others.\par - Avoid all non-essential travel including plane trips, and especially avoid embarking on cruise ships.\par -If COVID-19 is spreading in your community, take extra measures to put distance between yourself and other people to further reduce your risk of being exposed to this new virus.\par -Stay home as much as possible.\par - Consider ways of getting food brought to your house through family, social, or commercial networks\par -Be aware that the virus has been known to live in the air up to 3 hours post exposure, cardboard up to 24 hours post exposure, copper up to 4 hours post exposure, steel and plastic up to 2-3 days post exposure. Risk of transmission from these surfaces are affected by many variables.\par Immune Support Recommendations:\par -OTC Vitamin C 500mg BID \par -OTC Quercetin 250-500mg BID \par -OTC Zinc 75-100mg per day \par -OTC Melatonin 1 or 2 mg a night \par -OTC Vitamin D 1-4000mg per day \par -OTC Tonic Water 8oz per day\par Asthma and COVID19:\par You need to make sure your asthma is under control. This often requires the use of inhaled corticosteroids (and sometimes oral corticosteroids). Inhaled corticosteroids do not likely reduce your immune system’s ability to fight infections, but oral corticosteroids may. It is important to use the steps above to protect yourself to limit your exposure to any respiratory virus.\par \par Problem 10:health maintenance \par -PCP: Dr. Junior Frias\par -s/p flu shot 2020\par -recommended strep pneumonia vaccines: Prevnar-13 vaccine, followed by Pneumo vaccine 23 one year following (completed)\par -recommended early intervention for URIs\par -recommended regular osteoporosis evaluations\par -recommended early dermatological evaluations\par -recommended after the age of 50 to the age of 70, colonoscopy every 5 years \par \par F/U in 3-4 months \par pt is encouraged to call or fax the office with any questions or concerns. \par Explained to the pt in full detail with demonstrations how to use the inhalers and inhaler hygiene. \par -Education provided to the PT regarding their visit and conditions.

## 2020-10-14 NOTE — PHYSICAL EXAM
[General Appearance - Well Developed] : well developed [Normal Appearance] : normal appearance [General Appearance - Well Nourished] : well nourished [No Deformities] : no deformities [Well Groomed] : well groomed [General Appearance - In No Acute Distress] : no acute distress [Eyelids - No Xanthelasma] : the eyelids demonstrated no xanthelasmas [Normal Conjunctiva] : the conjunctiva exhibited no abnormalities [III] : III [Normal Oropharynx] : normal oropharynx [Neck Appearance] : the appearance of the neck was normal [Neck Cervical Mass (___cm)] : no neck mass was observed [Thyroid Diffuse Enlargement] : the thyroid was not enlarged [Jugular Venous Distention Increased] : there was no jugular-venous distention [Thyroid Nodule] : there were no palpable thyroid nodules [Heart Rate And Rhythm] : heart rate and rhythm were normal [Murmurs] : no murmurs present [Heart Sounds] : normal S1 and S2 [Respiration, Rhythm And Depth] : normal respiratory rhythm and effort [Exaggerated Use Of Accessory Muscles For Inspiration] : no accessory muscle use [Abdomen Tenderness] : non-tender [Abdomen Soft] : soft [Abnormal Walk] : normal gait [Gait - Sufficient For Exercise Testing] : the gait was sufficient for exercise testing [Abdomen Mass (___ Cm)] : no abdominal mass palpated [Nail Clubbing] : no clubbing of the fingernails [Petechial Hemorrhages (___cm)] : no petechial hemorrhages [Cyanosis, Localized] : no localized cyanosis [] : no rash [Skin Color & Pigmentation] : normal skin color and pigmentation [No Venous Stasis] : no venous stasis [Skin Lesions] : no skin lesions [No Xanthoma] : no  xanthoma was observed [No Skin Ulcers] : no skin ulcer [No Focal Deficits] : no focal deficits [Sensation] : the sensory exam was normal to light touch and pinprick [Deep Tendon Reflexes (DTR)] : deep tendon reflexes were 2+ and symmetric [Oriented To Time, Place, And Person] : oriented to person, place, and time [Impaired Insight] : insight and judgment were intact [Affect] : the affect was normal [FreeTextEntry1] : I:E ratio 1:3; clear  [FreeTextEntry2] : 1+ lower extremity edema.

## 2020-10-14 NOTE — PROCEDURE
[FreeTextEntry1] : CT (OCT.8.2020) reveals CT of 6/18/2020 demonstrated an airspace opacity within the superior of the left lower lobe which had both a groundglass component and a reticular component. the groundglass component has nearly resolved. the reticular component persists and is unchanged. The remainder of the CT is without significant change from most recent study of 6/18/2020.

## 2020-10-14 NOTE — HISTORY OF PRESENT ILLNESS
[FreeTextEntry1] : Ms. Borja is a 87 year old female with a history of abnormal chest CT, COPD, lung nodules, OW, poor balance, and snoring presenting to the office today for a follow up visit s/p VATS (neuroendocrine tumorlets). His chief complaint is \par -she states that this week her BP was 170. Today her BP was much lower.\par -reports constipation and is taking medications to help with it. \par -reports leg pains when she gets up.\par -has been getting enough sleep. \par -she believes that one of the medications she is using helps her sleep.\par -reports that she only coughs  twice a day. \par -she can't believe that her CT scan showed improvement.\par -she has not been able to walk since she has been quarantined in her room. \par \par \par \par -She denies any chest pain, chest pressure, diarrhea, dysphagia, dizziness, sour taste in the mouth, itchy eyes, itchy ears.

## 2020-10-14 NOTE — ADDENDUM
[FreeTextEntry1] : Documented by Cheko Glover acting as a scribe for Dr. Misael Joel on 10/14/2020 \par \par All medical record entries made by the Scribe were at my, Dr. Misael Joel's, direction and personally dictated by me on 10/14/2020 . I have reviewed the chart and agree that the record accurately reflects my personal performance of the history, physical exam, assessment and plan. I have also personally directed, reviewed, and agree with the discharge instructions.

## 2020-10-23 ENCOUNTER — NON-APPOINTMENT (OUTPATIENT)
Age: 85
End: 2020-10-23

## 2020-10-24 ENCOUNTER — RX RENEWAL (OUTPATIENT)
Age: 85
End: 2020-10-24

## 2020-10-29 ENCOUNTER — APPOINTMENT (OUTPATIENT)
Dept: INFECTIOUS DISEASE | Facility: CLINIC | Age: 85
End: 2020-10-29
Payer: MEDICARE

## 2020-10-29 VITALS
BODY MASS INDEX: 31.41 KG/M2 | DIASTOLIC BLOOD PRESSURE: 83 MMHG | OXYGEN SATURATION: 96 % | HEIGHT: 60 IN | WEIGHT: 160 LBS | SYSTOLIC BLOOD PRESSURE: 127 MMHG | TEMPERATURE: 99 F | HEART RATE: 76 BPM

## 2020-10-29 PROCEDURE — 99072 ADDL SUPL MATRL&STAF TM PHE: CPT

## 2020-10-29 PROCEDURE — 99204 OFFICE O/P NEW MOD 45 MIN: CPT | Mod: 25

## 2020-10-29 NOTE — ASSESSMENT
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 \par In this elderly patient with no immunocompromised state who doesn't have any symptoms besides her usual COPD, I prefer to watch closely without any treatment. If she had other type of NMTB, I would've consider treating her. Starting multiple antiTB meds with serious side effect profile in this elderly patient will not benefit her at this point. \par I will see her after next CT in Months or so or earlier PRN, for follow up, If there is significant worsening of CT or lung functions with symptoms then will consider starting treatment. \par Patient was given the opportunity to ask questions and all questions were answered to their satisfaction.\par Counseling included lab results, differential diagnosis, treatment options, risks and benefits, lifestyle changes, current condition, medications and dose adjustments.\par The patient verbalized understanding.\par Thank you for this referral. \par Dr. Joel please call me at my cell [phone] any time. \par  [Risk Reduction] : risk reduction [Disclosure of Diagnosis] : disclosure of diagnosis [Anticipatory Guidance] : anticipatory guidance

## 2020-10-29 NOTE — HISTORY OF PRESENT ILLNESS
[FreeTextEntry1] : 88y/o woman with multiple medical problems, former smoker with COPD and multiple lung nodules, was referred by pulmonary for positive sputum culture for NMTB. She has history of lung cancer stage 1 s/p resection few years ago with VATS done in 6/2019 for RML showing Necrotizing granulomatous changes, with neuroendocrine cell hyperplasia and tumorlet. AFB stating were negative. \par She follows regularly with pulmonary and had multiple CT scans in the past, latest one with some improvement in opacities/GGO. 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 \par She has no fever, night sweats or weight changes. \par Her cough, green sputum are at the baseline for years, due to COPD. No chest pain or SOB.

## 2020-11-09 ENCOUNTER — APPOINTMENT (OUTPATIENT)
Dept: INFECTIOUS DISEASE | Facility: CLINIC | Age: 85
End: 2020-11-09

## 2020-12-22 ENCOUNTER — RX RENEWAL (OUTPATIENT)
Age: 85
End: 2020-12-22

## 2021-02-08 DIAGNOSIS — Z01.812 ENCOUNTER FOR PREPROCEDURAL LABORATORY EXAMINATION: ICD-10-CM

## 2021-02-17 ENCOUNTER — APPOINTMENT (OUTPATIENT)
Dept: PULMONOLOGY | Facility: CLINIC | Age: 86
End: 2021-02-17
Payer: MEDICARE

## 2021-02-17 VITALS
WEIGHT: 160 LBS | RESPIRATION RATE: 16 BRPM | HEART RATE: 80 BPM | DIASTOLIC BLOOD PRESSURE: 70 MMHG | HEIGHT: 60 IN | TEMPERATURE: 97.4 F | SYSTOLIC BLOOD PRESSURE: 120 MMHG | OXYGEN SATURATION: 96 % | BODY MASS INDEX: 31.41 KG/M2

## 2021-02-17 PROCEDURE — 99072 ADDL SUPL MATRL&STAF TM PHE: CPT

## 2021-02-17 PROCEDURE — 99214 OFFICE O/P EST MOD 30 MIN: CPT

## 2021-02-17 NOTE — ASSESSMENT
[FreeTextEntry1] : Ms. Borja is a 87 year old female with a prior history of  breast CA 1999 lung cancer 2007, overweight, diabetes, hyperthyroidism, tremor, anxiety, COPD, diverticulosis, GERD who now comes in for a pulmonary re- evaluation. Her number one issue is her blood sugar, though she is improved overall - s/p VATS biopsy - Neuroendocrine tumorlets. She is currently stable from a pulmonary perspective. 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 \par -s/p CTS evaluation - Dr. Blair Kline \par -oncology evaluation - Dr. Ling \par -s/p blood work to include full rheumatologic panel - hypersensitivity panel , ESR level, ACE level  (negative)\par -follow up chest CT - next 3/2021\par \par Problem 2: COPD / Asthma\par - continue Singulair 10 mg QHS\par - Continue DuoNeb via nebulizer BID\par - Continue Pulmicort 0.5% via nebulizer BID\par - Continue Trelegy 1 inhalation QD\par Asthma is believed to be caused by inherited (genetic) and environmental factor, but its exact cause is unknown. Asthma may be triggered by allergens, lung infections, or irritants in the air. Asthma triggers are different for each person \par -COPD is a progressive disease and although it can’t be cured , appropriate management can slow its progression, reduce frequency and severity of exacerbations, and improve symptoms and the patient quality of life. Hospitalizations are the greatest contributor to the total COPD costs and account for up to 87% of total COPD related costs. Exacerbations are the main cause of admissions and subsequently account for the 40-75% of COPD costs. Inhaled maintenance therapy reduces the incidence of exacerbations in patients with stable COPD. Incorrect inhaler use and nonadherence are major obstacles to achieving COPD treatment goals. Many COPD patients have challenges (impaired inhalation, limited dexterity, reduced cognition: that limit their ability to correctly use their COPD treatment devices resulting in reduced symptom control. Of most importance is smoking cessation and early intervention with respiratory illnesses and contemplation for pulmonary rehab to enhance quality of life.\par -Inhaler technique reviewed as well as oral hygiene techniques reviewed with patient. Avoidance of cold air, extremes of temperature, rescue inhaler should be used before exercise. Order of medication reviewed with patient. Recommended use of a cool mist humidifier in the bedroom. 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 Tracheomalacia can be asymptomatic, however signs or symptoms can develop as the severity of the airway narrowing progresses with major symptoms include dyspnea, cough, and sputum retention. Other symptoms include severe paroxysms of coughing, wheezing or stridor, barking cough and may be exacerbated by forced expiration, cough, and valsalva maneuver. Tracheomalacia is diagnosed by a bronchoscopic visualization of dynamic airway collapse on dynamic chest CT. Therapy is warranted in symptomatic patients with severe tracheomalacia and includes surgical repair as tracheobronchoplasty. The patient was referred to Dr. Shamar Gan or Dr. Orlin Carver, at [facility] for a surgical consult. \par \par Problem 3: GERD \par -continue Omeprazole 20 mg pre-breakfast\par Things to avoid including overeating, spicy foods, tight clothing, eating within three hours of bed, this list is not all inclusive. \par -For treatment of reflux, possible options discussed including diet control, H2 blockers, PPIs, as well as coating motility agents discussed as treatment options. Timing of meals and proximity of last meal to sleep were discussed. If symptoms persist, a formal gastrointestinal evaluation is needed.\par \par Problem 4: poor balance\par -Recommended balance therapy (Gait Training)\par \par Problem 5: ? OSAS\par -Due to her snoring, elevated Mallampati class, and EDS, she is being recommended for a home sleep study to discern for sleep apnea \par Sleep apnea is associated with adverse clinical consequences which an affect most organ systems. Cardiovascular disease risk includes arrhythmias, atrial fibrillation, hypertension, coronary artery disease, and stroke. Metabolic disorders include diabetes type 2, non-alcoholic fatty liver disease. Mood disorder especially depression; and cognitive decline especially in the elderly. Associations with chronic reflux/Sullivan’s esophagus some but not all inclusive. \par -Reasons include arousal consistent with hypopnea; respiratory events most prominent in REM sleep or supine position; therefore sleep staging and body position are important for accurate diagnosis and estimation of AHI. \par \par Problem 6: Overweight \par - Weight loss, exercise, and diet control were discussed and are highly encouraged. Treatment options were given such as, aqua therapy, and contacting a nutritionist. Recommended to use the elliptical, stationary bike, less use of treadmill. Mindful eating was explained to the patient Obesity is associated with worsening asthma, shortness of breath, and potential for cardiac disease, diabetes, and other underlying medical conditions. \par \par Problem 7: Poor breathing\par Proper breathing techniques were reviewed with an emphasis of exhalation. Patient instructed to breath in for 1 second and out for four seconds. Patient was encouraged to not talk while walking. \par \par Problem 8: Health Maintenance/COVID19 Precautions:\par -s/p 2 vaccines\par - Clean your hands often. Wash your hands often with soap and water for at least 20 seconds, especially after blowing your nose, coughing, or sneezing, or having been in a public place.\par - If soap and water are not available, use a hand  that contains at least 60% alcohol.\par - To the extent possible, avoid touching high-touch surfaces in public places - elevator buttons, door handles, handrails, handshaking with people, etc. Use a tissue or your sleeve to cover your hand or finger if you must touch something.\par - Wash your hands after touching surfaces in public places.\par - Avoid touching your face, nose, eyes, etc.\par - Clean and disinfect your home to remove germs: practice routine cleaning of frequently touched surfaces (for example: tables, doorknobs, light switches, handles, desks, toilets, faucets, sinks & cell phones)\par - Avoid crowds, especially in poorly ventilated spaces. Your risk of exposure to respiratory viruses like COVID-19 may increase in crowded, closed-in settings with little air circulation if there are people in the crowd who are sick. All patients are recommended to practice social distancing and stay at least 6 feet away from others.\par - Avoid all non-essential travel including plane trips, and especially avoid embarking on cruise ships.\par -If COVID-19 is spreading in your community, take extra measures to put distance between yourself and other people to further reduce your risk of being exposed to this new virus.\par -Stay home as much as possible.\par - Consider ways of getting food brought to your house through family, social, or commercial networks\par -Be aware that the virus has been known to live in the air up to 3 hours post exposure, cardboard up to 24 hours post exposure, copper up to 4 hours post exposure, steel and plastic up to 2-3 days post exposure. Risk of transmission from these surfaces are affected by many variables.\par Immune Support Recommendations:\par -OTC Vitamin C 500mg BID \par -OTC Quercetin 250-500mg BID \par -OTC Zinc 75-100mg per day \par -OTC Melatonin 1 or 2 mg a night \par -OTC Vitamin D 1-4000mg per day \par -OTC Tonic Water 8oz per day\par Asthma and COVID19:\par You need to make sure your asthma is under control. This often requires the use of inhaled corticosteroids (and sometimes oral corticosteroids). Inhaled corticosteroids do not likely reduce your immune system’s ability to fight infections, but oral corticosteroids may. It is important to use the steps above to protect yourself to limit your exposure to any respiratory virus.\par \par Problem 10:health maintenance \par -PCP: Dr. Junior Frias\par -s/p flu shot 2020\par -recommended strep pneumonia vaccines: Prevnar-13 vaccine, followed by Pneumo vaccine 23 one year following (completed)\par -recommended early intervention for URIs\par -recommended regular osteoporosis evaluations\par -recommended early dermatological evaluations\par -recommended after the age of 50 to the age of 70, colonoscopy every 5 years \par \par F/U in 3-4 months \par pt is encouraged to call or fax the office with any questions or concerns. \par Explained to the pt in full detail with demonstrations how to use the inhalers and inhaler hygiene. \par -Education provided to the PT regarding their visit and conditions.

## 2021-02-17 NOTE — PHYSICAL EXAM

## 2021-02-17 NOTE — HISTORY OF PRESENT ILLNESS
[FreeTextEntry1] : Ms. Borja is a 87 year old female with a history of abnormal chest CT, COPD, lung nodules, OW, poor balance, and snoring presenting to the office today for a follow up visit s/p VATS (neuroendocrine tumorlets). His chief complaint is pandemic boredom\par -she is s/p the Covid vaccine, and had no side effects\par -she notes she is feeling bored and alone due to the pandemic\par -she notes she coughs slightly in the mornings\par -she notes she often has chest pressure from gas buildup\par -she notes she has moderate constipation\par -she reports she has occasional dysphagia, but it doesn’t really affect her\par -she notes she has been sleeping well\par -she notes her BP is controlled\par -she denies taking any new medications, vitamins, or supplements. \par -she denies any wheezing, chest pain, chest pressure, diarrhea, dizziness, sour taste in the mouth, heartburn, reflux, myalgias or arthralgias.

## 2021-02-17 NOTE — ADDENDUM
[FreeTextEntry1] : Documented by Frankie Freeman acting as a scribe for Dr. Misael Joel on 02/17/2021.\par \par All medical record entries made by the Scribe were at my, Dr. Misael Joel's, direction and personally dictated by me on 02/17/2021. I have reviewed the chart and agree that the record accurately reflects my personal performance of the history, physical exam, assessment and plan. I have also personally directed, reviewed, and agree with the discharge instructions.

## 2021-02-17 NOTE — REVIEW OF SYSTEMS
[Negative] : Endocrine [Cough] : cough [Constipation] : constipation [Dysphagia] : dysphagia [Wheezing] : no wheezing [Chest Discomfort] : no chest discomfort [GERD] : no gerd [Diarrhea] : no diarrhea [Dizziness] : no dizziness [TextBox_69] : gas buildup

## 2021-03-02 ENCOUNTER — NON-APPOINTMENT (OUTPATIENT)
Age: 86
End: 2021-03-02

## 2021-04-06 NOTE — PATIENT PROFILE ADULT - NSPROGENARRIVEDFROM_GEN_A_NUR
Impression: wet AMD OD Plan: Exam/OCT show RPE rip without recurrent IRF/SRF. Dicussed obs vs Tx, pt elects maint Tx. [facility] OD today.   

12 weeks, DFE/OCT OU, [facility] assisted living facility

## 2021-05-03 RX ORDER — SITAGLIPTIN 50 MG/1
50 TABLET, FILM COATED ORAL
Qty: 90 | Refills: 1 | Status: ACTIVE | COMMUNITY
Start: 2019-02-08 | End: 1900-01-01

## 2021-05-06 ENCOUNTER — RX RENEWAL (OUTPATIENT)
Age: 86
End: 2021-05-06

## 2021-05-10 ENCOUNTER — APPOINTMENT (OUTPATIENT)
Dept: GERIATRICS | Facility: ASSISTED LIVING FACILITY | Age: 86
End: 2021-05-10

## 2021-05-11 DIAGNOSIS — I25.10 ATHEROSCLEROTIC HEART DISEASE OF NATIVE CORONARY ARTERY W/OUT ANGINA PECTORIS: ICD-10-CM

## 2021-05-11 RX ORDER — LEVOTHYROXINE SODIUM 0.09 MG/1
88 TABLET ORAL
Qty: 90 | Refills: 1 | Status: ACTIVE | COMMUNITY
Start: 2018-05-08 | End: 1900-01-01

## 2021-05-18 ENCOUNTER — APPOINTMENT (OUTPATIENT)
Dept: GERIATRICS | Facility: CLINIC | Age: 86
End: 2021-05-18
Payer: MEDICARE

## 2021-05-18 DIAGNOSIS — R01.1 CARDIAC MURMUR, UNSPECIFIED: ICD-10-CM

## 2021-05-18 DIAGNOSIS — E78.5 HYPERLIPIDEMIA, UNSPECIFIED: ICD-10-CM

## 2021-05-18 RX ORDER — SENNOSIDES 8.6 MG
8.6 TABLET ORAL
Qty: 90 | Refills: 3 | Status: DISCONTINUED | COMMUNITY
Start: 1900-01-01 | End: 2021-05-18

## 2021-05-18 RX ORDER — OMEPRAZOLE 20 MG/1
20 CAPSULE, DELAYED RELEASE ORAL DAILY
Qty: 90 | Refills: 1 | Status: DISCONTINUED | OUTPATIENT
Start: 2019-05-29 | End: 2021-05-18

## 2021-05-18 RX ORDER — DOCUSATE SODIUM 100 MG/1
100 CAPSULE ORAL
Qty: 90 | Refills: 11 | Status: DISCONTINUED | COMMUNITY
Start: 1900-01-01 | End: 2021-05-18

## 2021-05-26 PROBLEM — E78.5 HLD (HYPERLIPIDEMIA): Status: ACTIVE | Noted: 2021-05-18

## 2021-05-26 PROBLEM — R01.1 CARDIAC MURMUR: Status: ACTIVE | Noted: 2021-05-26

## 2021-05-26 RX ORDER — NYSTATIN 100000 [USP'U]/ML
100000 SUSPENSION ORAL
Qty: 450 | Refills: 0 | Status: DISCONTINUED | COMMUNITY
Start: 2018-12-12 | End: 2021-05-26

## 2021-05-26 NOTE — HISTORY OF PRESENT ILLNESS
[FreeTextEntry1] : 87yoF with pmhx of DM2, COPD, anxiety, stress incontinence, GERD who is seen for initial visit. \par \par \par lives at [facility]:  on medication management program\par \par nebulizer machine 2x daily that she does herself without problems\par \par anxeity; has been on clonazepam x 20 years.\par hx of not tolerating zoloft.\par \par notes stool is too loose.\par \par hr 72\par 140/80\par 94%\par \par wears hearing aide\par \par hx of Breast cancer 1999 s/p right breast lumpectomy and RT\par hx of lung cancer s/p VATS wedge resection of right middle lobe nodules in 6/2019, left lung cancer s/p left lobectomy 2007.\par \par HCP son Reilly Borja\par \par \par  [No falls in past year] : Patient reported no falls in the past year

## 2021-05-26 NOTE — REVIEW OF SYSTEMS
[Loss Of Hearing] : hearing loss [Fever] : no fever [Cough] : no cough [Eyesight Problems] : no eyesight problems

## 2021-05-26 NOTE — PHYSICAL EXAM
[General Appearance - Alert] : alert [General Appearance - In No Acute Distress] : in no acute distress [Sclera] : the sclera and conjunctiva were normal [Extraocular Movements] : extraocular movements were intact [No Oral Pallor] : no oral pallor [Outer Ear] : the ears and nose were normal in appearance [Neck Appearance] : the appearance of the neck was normal [] : no respiratory distress [Respiration, Rhythm And Depth] : normal respiratory rhythm and effort [Exaggerated Use Of Accessory Muscles For Inspiration] : no accessory muscle use [Auscultation Breath Sounds / Voice Sounds] : lungs were clear to auscultation bilaterally [Heart Rate And Rhythm] : heart rate was normal and rhythm regular [Heart Sounds] : normal S1 and S2 [Edema] : there was no peripheral edema [Bowel Sounds] : normal bowel sounds [Abdomen Soft] : soft [Abdomen Tenderness] : non-tender [Cervical Lymph Nodes Enlarged Posterior Bilaterally] : posterior cervical [Cervical Lymph Nodes Enlarged Anterior Bilaterally] : anterior cervical [Supraclavicular Lymph Nodes Enlarged Bilaterally] : supraclavicular [Abnormal Walk] : normal gait [Nail Clubbing] : no clubbing  or cyanosis of the fingernails [Involuntary Movements] : no involuntary movements were seen [Skin Color & Pigmentation] : normal skin color and pigmentation [No Focal Deficits] : no focal deficits [Affect] : the affect was normal [Mood] : the mood was normal [FreeTextEntry1] : +systolic murmur [MentalStatusTotal] : n

## 2021-05-28 LAB
BASOPHILS # BLD AUTO: 0.03 K/UL
BASOPHILS NFR BLD AUTO: 0.5 %
EOSINOPHIL # BLD AUTO: 0.18 K/UL
EOSINOPHIL NFR BLD AUTO: 2.8 %
ESTIMATED AVERAGE GLUCOSE: 157 MG/DL
HBA1C MFR BLD HPLC: 7.1 %
HCT VFR BLD CALC: 39.4 %
HGB BLD-MCNC: 12.5 G/DL
IMM GRANULOCYTES NFR BLD AUTO: 1.2 %
LYMPHOCYTES # BLD AUTO: 2.15 K/UL
LYMPHOCYTES NFR BLD AUTO: 33 %
MAN DIFF?: NORMAL
MCHC RBC-ENTMCNC: 29.8 PG
MCHC RBC-ENTMCNC: 31.7 GM/DL
MCV RBC AUTO: 93.8 FL
MONOCYTES # BLD AUTO: 0.59 K/UL
MONOCYTES NFR BLD AUTO: 9.1 %
NEUTROPHILS # BLD AUTO: 3.48 K/UL
NEUTROPHILS NFR BLD AUTO: 53.4 %
PLATELET # BLD AUTO: 255 K/UL
RBC # BLD: 4.2 M/UL
RBC # FLD: 15.1 %
WBC # FLD AUTO: 6.51 K/UL

## 2021-06-01 ENCOUNTER — RX CHANGE (OUTPATIENT)
Age: 86
End: 2021-06-01

## 2021-06-01 LAB
25(OH)D3 SERPL-MCNC: 46.2 NG/ML
ALBUMIN SERPL ELPH-MCNC: 4.2 G/DL
ALP BLD-CCNC: 78 U/L
ALT SERPL-CCNC: 31 U/L
ANION GAP SERPL CALC-SCNC: 12 MMOL/L
AST SERPL-CCNC: 28 U/L
BILIRUB SERPL-MCNC: 0.5 MG/DL
BUN SERPL-MCNC: 22 MG/DL
CALCIUM SERPL-MCNC: 9.5 MG/DL
CHLORIDE SERPL-SCNC: 104 MMOL/L
CO2 SERPL-SCNC: 24 MMOL/L
CREAT SERPL-MCNC: 1.13 MG/DL
FOLATE SERPL-MCNC: 3.8 NG/ML
GLUCOSE SERPL-MCNC: 144 MG/DL
POTASSIUM SERPL-SCNC: 4.5 MMOL/L
PROT SERPL-MCNC: 6.7 G/DL
SODIUM SERPL-SCNC: 140 MMOL/L
TSH SERPL-ACNC: 2.8 UIU/ML
VIT B12 SERPL-MCNC: 1431 PG/ML

## 2021-06-01 RX ORDER — ASPIRIN ENTERIC COATED TABLETS 81 MG 81 MG/1
81 TABLET, DELAYED RELEASE ORAL
Qty: 30 | Refills: 6 | Status: ACTIVE | COMMUNITY
Start: 2021-05-11

## 2021-06-01 RX ORDER — POLYETHYLENE GLYCOL 3350 17 G/17G
17 POWDER, FOR SOLUTION ORAL
Refills: 0 | Status: ACTIVE | COMMUNITY
Start: 2021-06-01

## 2021-06-15 ENCOUNTER — APPOINTMENT (OUTPATIENT)
Dept: GERIATRICS | Facility: CLINIC | Age: 86
End: 2021-06-15
Payer: MEDICARE

## 2021-06-15 NOTE — HISTORY OF PRESENT ILLNESS
[FreeTextEntry1] : 87yoF with pmhx of DM2, COPD, anxiety, stress incontinence, GERD who is seen for fu visit. \par \par \par lives at [facility]:  on medication management program\par \par nebulizer machine 2x daily that she does herself without problems\par \par anxiety/depression:; has been on clonazepam x 20 years.\par hx of not tolerating zoloft.\par notes slight increase in anxiety symptoms with reduction of clonazepam, but manageable \par she gave up her car 2 weeks ago\par hx of seeing therapist, declining referral \par \par notes stool is too loose with full dose of miralax. dnies ab pain.\par \par hr 69\par 130/80\par 95%\par \par DM2: adherent with glipizide and januvia\par \par hypothyroidism:\par adherent with snthoird\par wears hearing aide\par \par hx of Breast cancer 1999 s/p right breast lumpectomy and RT\par hx of lung cancer s/p VATS wedge resection of right middle lobe nodules in 6/2019, left lung cancer s/p left lobectomy 2007.\par \par HCP son Reilly Borja\par \par \par  [No falls in past year] : Patient reported no falls in the past year

## 2021-06-15 NOTE — REVIEW OF SYSTEMS
[Fever] : no fever [Eyesight Problems] : no eyesight problems [Loss Of Hearing] : hearing loss [Cough] : no cough

## 2021-06-15 NOTE — PHYSICAL EXAM
[General Appearance - Alert] : alert [General Appearance - In No Acute Distress] : in no acute distress [Sclera] : the sclera and conjunctiva were normal [Extraocular Movements] : extraocular movements were intact [No Oral Pallor] : no oral pallor [Outer Ear] : the ears and nose were normal in appearance [Neck Appearance] : the appearance of the neck was normal [] : no respiratory distress [Respiration, Rhythm And Depth] : normal respiratory rhythm and effort [Exaggerated Use Of Accessory Muscles For Inspiration] : no accessory muscle use [Auscultation Breath Sounds / Voice Sounds] : lungs were clear to auscultation bilaterally [Heart Rate And Rhythm] : heart rate was normal and rhythm regular [Heart Sounds] : normal S1 and S2 [FreeTextEntry1] : +systolic murmur [Edema] : there was no peripheral edema [Bowel Sounds] : normal bowel sounds [Abdomen Soft] : soft [Abdomen Tenderness] : non-tender [Cervical Lymph Nodes Enlarged Posterior Bilaterally] : posterior cervical [Cervical Lymph Nodes Enlarged Anterior Bilaterally] : anterior cervical [Supraclavicular Lymph Nodes Enlarged Bilaterally] : supraclavicular [Abnormal Walk] : normal gait [Nail Clubbing] : no clubbing  or cyanosis of the fingernails [Involuntary Movements] : no involuntary movements were seen [Skin Color & Pigmentation] : normal skin color and pigmentation [No Focal Deficits] : no focal deficits [Affect] : the affect was normal [Mood] : the mood was normal

## 2021-06-16 ENCOUNTER — APPOINTMENT (OUTPATIENT)
Dept: PULMONOLOGY | Facility: CLINIC | Age: 86
End: 2021-06-16
Payer: MEDICARE

## 2021-06-16 ENCOUNTER — NON-APPOINTMENT (OUTPATIENT)
Age: 86
End: 2021-06-16

## 2021-06-16 VITALS
RESPIRATION RATE: 16 BRPM | BODY MASS INDEX: 31.02 KG/M2 | TEMPERATURE: 97.4 F | DIASTOLIC BLOOD PRESSURE: 74 MMHG | HEART RATE: 80 BPM | SYSTOLIC BLOOD PRESSURE: 130 MMHG | WEIGHT: 158 LBS | HEIGHT: 60 IN | OXYGEN SATURATION: 94 %

## 2021-06-16 DIAGNOSIS — R06.83 SNORING: ICD-10-CM

## 2021-06-16 DIAGNOSIS — E66.3 OVERWEIGHT: ICD-10-CM

## 2021-06-16 DIAGNOSIS — Z71.89 OTHER SPECIFIED COUNSELING: ICD-10-CM

## 2021-06-16 DIAGNOSIS — A31.9 MYCOBACTERIAL INFECTION, UNSPECIFIED: ICD-10-CM

## 2021-06-16 PROCEDURE — 94010 BREATHING CAPACITY TEST: CPT

## 2021-06-16 PROCEDURE — 95012 NITRIC OXIDE EXP GAS DETER: CPT

## 2021-06-16 PROCEDURE — 99214 OFFICE O/P EST MOD 30 MIN: CPT | Mod: 25

## 2021-06-16 PROCEDURE — 99072 ADDL SUPL MATRL&STAF TM PHE: CPT

## 2021-06-16 NOTE — REVIEW OF SYSTEMS
[Negative] : Endocrine [Cough] : cough [Sputum] : sputum [Chest Discomfort] : no chest discomfort [GERD] : no gerd [Diarrhea] : no diarrhea [Constipation] : no constipation [Dysphagia] : no dysphagia

## 2021-06-16 NOTE — ASSESSMENT
[FreeTextEntry1] : Ms. Borja is a 87 year old female with a prior history of  breast CA 1999 lung cancer 2007, overweight, diabetes, hyperthyroidism, tremor, anxiety, COPD, diverticulosis, GERD who now comes in for a pulmonary re- evaluation. Her number one issue is her blood sugar, though she is improved overall - s/p VATS biopsy - Neuroendocrine tumorlets. She is currently stable from a pulmonary perspective - persistent cough\par \par The patient's SOB is felt to be multifactorial:\par - Overweight / Out-of Shape\par - Poor breathing mechanics (could be secondary to poor balance or anxiety)\par - Restrictive Lung Disease- Lobectomy with loss of volume and overweight \par - Obstructive Lung Disease: Asthma/ COPD/?TBM\par - Cardiac Disease \par \par Problem 1: Abnormal CT Chest -(c/w neuroendocrine tumorlets 6/19) - variable\par -she is s/p a Quantiferon Gold test, which was negative. \par -s/p CTS evaluation - Dr. Blair Kline \par -oncology evaluation - Dr. Ling \par -s/p blood work to include full rheumatologic panel - hypersensitivity panel , ESR level, ACE level  (negative)\par -follow up chest CT - next 9/2021 (dynamic CT)\par \par Problem 2: COPD / Asthma\par - continue Singulair 10 mg QHS\par - Continue DuoNeb via nebulizer BID\par - Continue Pulmicort 0.5% via nebulizer BID\par - Continue Trelegy 1 inhalation QD\par Asthma is believed to be caused by inherited (genetic) and environmental factor, but its exact cause is unknown. Asthma may be triggered by allergens, lung infections, or irritants in the air. Asthma triggers are different for each person \par -COPD is a progressive disease and although it can’t be cured , appropriate management can slow its progression, reduce frequency and severity of exacerbations, and improve symptoms and the patient quality of life. Hospitalizations are the greatest contributor to the total COPD costs and account for up to 87% of total COPD related costs. Exacerbations are the main cause of admissions and subsequently account for the 40-75% of COPD costs. Inhaled maintenance therapy reduces the incidence of exacerbations in patients with stable COPD. Incorrect inhaler use and nonadherence are major obstacles to achieving COPD treatment goals. Many COPD patients have challenges (impaired inhalation, limited dexterity, reduced cognition: that limit their ability to correctly use their COPD treatment devices resulting in reduced symptom control. Of most importance is smoking cessation and early intervention with respiratory illnesses and contemplation for pulmonary rehab to enhance quality of life.\par -Inhaler technique reviewed as well as oral hygiene techniques reviewed with patient. Avoidance of cold air, extremes of temperature, rescue inhaler should be used before exercise. Order of medication reviewed with patient. Recommended use of a cool mist humidifier in the bedroom. 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 Tracheomalacia can be asymptomatic, however signs or symptoms can develop as the severity of the airway narrowing progresses with major symptoms include dyspnea, cough, and sputum retention. Other symptoms include severe paroxysms of coughing, wheezing or stridor, barking cough and may be exacerbated by forced expiration, cough, and valsalva maneuver. Tracheomalacia is diagnosed by a bronchoscopic visualization of dynamic airway collapse on dynamic chest CT. Therapy is warranted in symptomatic patients with severe tracheomalacia and includes surgical repair as tracheobronchoplasty. The patient was referred to Dr. Shamar Gan or Dr. Orlin Carver, at [facility] for a surgical consult. \par \par Problem 3: GERD \par -continue Omeprazole 20 mg pre-breakfast\par Things to avoid including overeating, spicy foods, tight clothing, eating within three hours of bed, this list is not all inclusive. \par -For treatment of reflux, possible options discussed including diet control, H2 blockers, PPIs, as well as coating motility agents discussed as treatment options. Timing of meals and proximity of last meal to sleep were discussed. If symptoms persist, a formal gastrointestinal evaluation is needed.\par \par Problem 4: poor balance\par -Recommended balance therapy (Gait Training)\par \par Problem 5: ? OSAS\par -Due to her snoring, elevated Mallampati class, and EDS, she is being recommended for a home sleep study to discern for sleep apnea \par Sleep apnea is associated with adverse clinical consequences which an affect most organ systems. Cardiovascular disease risk includes arrhythmias, atrial fibrillation, hypertension, coronary artery disease, and stroke. Metabolic disorders include diabetes type 2, non-alcoholic fatty liver disease. Mood disorder especially depression; and cognitive decline especially in the elderly. Associations with chronic reflux/Sullivan’s esophagus some but not all inclusive. \par -Reasons include arousal consistent with hypopnea; respiratory events most prominent in REM sleep or supine position; therefore sleep staging and body position are important for accurate diagnosis and estimation of AHI. \par \par Problem 6: Overweight \par - Weight loss, exercise, and diet control were discussed and are highly encouraged. Treatment options were given such as, aqua therapy, and contacting a nutritionist. Recommended to use the elliptical, stationary bike, less use of treadmill. Mindful eating was explained to the patient Obesity is associated with worsening asthma, shortness of breath, and potential for cardiac disease, diabetes, and other underlying medical conditions. \par \par Problem 7: Poor breathing\par Proper breathing techniques were reviewed with an emphasis of exhalation. Patient instructed to breath in for 1 second and out for four seconds. Patient was encouraged to not talk while walking. \par \par Problem 8: Health Maintenance/COVID19 Precautions:\par -s/p 2 vaccines\par - Clean your hands often. Wash your hands often with soap and water for at least 20 seconds, especially after blowing your nose, coughing, or sneezing, or having been in a public place.\par - If soap and water are not available, use a hand  that contains at least 60% alcohol.\par - To the extent possible, avoid touching high-touch surfaces in public places - elevator buttons, door handles, handrails, handshaking with people, etc. Use a tissue or your sleeve to cover your hand or finger if you must touch something.\par - Wash your hands after touching surfaces in public places.\par - Avoid touching your face, nose, eyes, etc.\par - Clean and disinfect your home to remove germs: practice routine cleaning of frequently touched surfaces (for example: tables, doorknobs, light switches, handles, desks, toilets, faucets, sinks & cell phones)\par - Avoid crowds, especially in poorly ventilated spaces. Your risk of exposure to respiratory viruses like COVID-19 may increase in crowded, closed-in settings with little air circulation if there are people in the crowd who are sick. All patients are recommended to practice social distancing and stay at least 6 feet away from others.\par - Avoid all non-essential travel including plane trips, and especially avoid embarking on cruise ships.\par -If COVID-19 is spreading in your community, take extra measures to put distance between yourself and other people to further reduce your risk of being exposed to this new virus.\par -Stay home as much as possible.\par - Consider ways of getting food brought to your house through family, social, or commercial networks\par -Be aware that the virus has been known to live in the air up to 3 hours post exposure, cardboard up to 24 hours post exposure, copper up to 4 hours post exposure, steel and plastic up to 2-3 days post exposure. Risk of transmission from these surfaces are affected by many variables.\par Immune Support Recommendations:\par -OTC Vitamin C 500mg BID \par -OTC Quercetin 250-500mg BID \par -OTC Zinc 75-100mg per day \par -OTC Melatonin 1 or 2 mg a night \par -OTC Vitamin D 1-4000mg per day \par -OTC Tonic Water 8oz per day\par Asthma and COVID19:\par You need to make sure your asthma is under control. This often requires the use of inhaled corticosteroids (and sometimes oral corticosteroids). Inhaled corticosteroids do not likely reduce your immune system’s ability to fight infections, but oral corticosteroids may. It is important to use the steps above to protect yourself to limit your exposure to any respiratory virus.\par \par Problem 10:health maintenance \par -PCP: Dr. Junior Frias\par -s/p flu shot 2020\par -recommended strep pneumonia vaccines: Prevnar-13 vaccine, followed by Pneumo vaccine 23 one year following (completed)\par -recommended early intervention for URIs\par -recommended regular osteoporosis evaluations\par -recommended early dermatological evaluations\par -recommended after the age of 50 to the age of 70, colonoscopy every 5 years \par \par F/U in 3-4 months \par pt is encouraged to call or fax the office with any questions or concerns. \par Explained to the pt in full detail with demonstrations how to use the inhalers and inhaler hygiene. \par -Education provided to the PT regarding their visit and conditions.

## 2021-06-16 NOTE — PROCEDURE
[FreeTextEntry1] : PFT revealed very mild obstructive dysfunction, with a FEV1 of 1.04L, which is 79% of predicted, normal lung volumes, and with a normal flow volume loop.\par \par Patient was unable to complete Feno / NiOx test. \par \par CT Chest (6.10.2021) reveals new small consolidation with air bronchograms within the posterior right lower lobe. Its appearance suggests inflammatory/ infectious in etiology. Following appropriate therapy a repeat CT of the chest is suggested in 3 months. The remainder of the CT of the chest is without significant change form prior of 2/25/2021.

## 2021-06-16 NOTE — HISTORY OF PRESENT ILLNESS
[FreeTextEntry1] : Ms. Borja is a 87 year old female with a history of abnormal chest CT, COPD, lung nodules, OW, poor balance, and snoring presenting to the office today for a follow up visit s/p VATS (neuroendocrine tumorlets). His chief complaint is\par -she reports feeling generally well\par -she states she is sleeping well, getting at least 6-7 hours nightly, and takes a nap during the afternoon if needed\par -she reports she notes she eats well\par -she states her balance is slightly off, and has fallen in the past\par -she reports her vision is good\par -she reports she is bringing up green sputum with her cough\par \par -she denies any chest pain, chest pressure, diarrhea, constipation, dysphagia, dizziness, sour taste in the mouth, heartburn, reflux, myalgias or arthralgias.

## 2021-06-16 NOTE — PHYSICAL EXAM

## 2021-06-16 NOTE — ADDENDUM
[FreeTextEntry1] : Documented by Frankie Freeman acting as a scribe for Dr. Misael Joel on 06/16/2021.\par \par All medical record entries made by the Scribe were at my, Dr. Misael Joel's, direction and personally dictated by me on 06/16/2021. I have reviewed the chart and agree that the record accurately reflects my personal performance of the history, physical exam, assessment and plan. I have also personally directed, reviewed, and agree with the discharge instructions.

## 2021-07-13 ENCOUNTER — APPOINTMENT (OUTPATIENT)
Dept: GERIATRICS | Facility: CLINIC | Age: 86
End: 2021-07-13
Payer: MEDICARE

## 2021-07-13 NOTE — PHYSICAL EXAM
[General Appearance - Alert] : alert [General Appearance - In No Acute Distress] : in no acute distress [Sclera] : the sclera and conjunctiva were normal [Extraocular Movements] : extraocular movements were intact [FreeTextEntry1] : whitish film on tongue [Respiration, Rhythm And Depth] : normal respiratory rhythm and effort [Exaggerated Use Of Accessory Muscles For Inspiration] : no accessory muscle use [Auscultation Breath Sounds / Voice Sounds] : lungs were clear to auscultation bilaterally [Affect] : the affect was normal [Mood] : the mood was normal

## 2021-07-13 NOTE — HISTORY OF PRESENT ILLNESS
[FreeTextEntry1] : 88yoF with pmhx of DM2, COPD, anxiety, stress incontinence, GERD who is seen for acute visit.\par \par \par lives at [facility]: on medication management program\par \par nebulizer machine 2x daily that she does herself without problems\par \par today complains of a peeling sensation in her mouth.\par states she doesn't always rinse her mouth after her treatments\par she has hx of thrush\par she denies mouth pain or bleeding.\par \par also patient requesting to stop 5pm dose of amitriptyline as she states she no longer needs it.  She dislikes goes to wellness office 3 x daily and requests to change to back to twice daily.\par she states her coughing has improved.\par \par \par \par

## 2021-07-20 RX ORDER — AMITRIPTYLINE HYDROCHLORIDE 10 MG/1
10 TABLET, FILM COATED ORAL
Qty: 90 | Refills: 3 | Status: DISCONTINUED | OUTPATIENT
Start: 2021-06-16 | End: 2021-07-20

## 2021-07-22 ENCOUNTER — RX RENEWAL (OUTPATIENT)
Age: 86
End: 2021-07-22

## 2021-07-27 ENCOUNTER — APPOINTMENT (OUTPATIENT)
Dept: GERIATRICS | Facility: CLINIC | Age: 86
End: 2021-07-27
Payer: MEDICARE

## 2021-07-27 DIAGNOSIS — Z79.899 OTHER LONG TERM (CURRENT) DRUG THERAPY: ICD-10-CM

## 2021-08-05 PROBLEM — Z79.899 MEDICATION COURSE CHANGED: Status: ACTIVE | Noted: 2021-08-05

## 2021-08-05 NOTE — ASSESSMENT
[FreeTextEntry1] : changes in medication administration times adjusted--  Jacoby santos is pharm with electronic Medication record for [facility]\par \par patient uses [facility]

## 2021-08-05 NOTE — PHYSICAL EXAM
[Alert] : alert [No Acute Distress] : in no acute distress [Sclera] : the sclera and conjunctiva were normal [EOMI] : extraocular movements were intact [Normal Appearance] : the appearance of the neck was normal [No Respiratory Distress] : no respiratory distress [No Acc Muscle Use] : no accessory muscle use [Respiration, Rhythm And Depth] : normal respiratory rhythm and effort

## 2021-08-05 NOTE — HISTORY OF PRESENT ILLNESS
[FreeTextEntry1] : 88yoF with pmhx of DM2, COPD, anxiety, stress incontinence, GERD who is seen for acute visit.\par \par \par lives at [facility]: on medication management program\par \par nebulizer machine 2x daily that she does herself without problems\par \par thrush is improved.  she has hx of thrush, and is asking to have renews on her nystatin mouth wash.\par \par She also reports it is difficult for her to come to wellness office at 9pm for her evening dose of medications-  she would prefer to take earlier in evening \par \par discussed with wellness staff-  medication allowed times include 5pm or 7:30pm\par patient is agreeable to 7:30pm time slot\par

## 2021-08-17 ENCOUNTER — APPOINTMENT (OUTPATIENT)
Dept: GERIATRICS | Facility: CLINIC | Age: 86
End: 2021-08-17
Payer: MEDICARE

## 2021-08-17 RX ORDER — PROPRANOLOL HYDROCHLORIDE 120 MG/1
120 CAPSULE, EXTENDED RELEASE ORAL
Qty: 30 | Refills: 6 | Status: ACTIVE | COMMUNITY
Start: 2018-08-17

## 2021-08-17 NOTE — PHYSICAL EXAM
[Alert] : alert [Healthy Appearing] : healthy appearing [No Acute Distress] : in no acute distress [Sclera] : the sclera and conjunctiva were normal [EOMI] : extraocular movements were intact [No Oral Pallor] : no oral pallor [Normal Outer Ear/Nose] : the ears and nose were normal in appearance [Normal Appearance] : the appearance of the neck was normal [No Neck Mass] : no neck mass was observed [Supple] : the neck was supple [No Respiratory Distress] : no respiratory distress [No Acc Muscle Use] : no accessory muscle use [Respiration, Rhythm And Depth] : normal respiratory rhythm and effort [Auscultation Breath Sounds / Voice Sounds] : lungs were clear to auscultation bilaterally [Normal S1, S2] : normal S1 and S2 [Heart Rate And Rhythm] : heart rate was normal and rhythm regular [Bowel Sounds] : normal bowel sounds [Abdomen Tenderness] : non-tender [No Masses] : no abdominal mass palpated [Abdomen Soft] : soft [Cervical Lymph Nodes Enlarged Posterior Bilaterally] : posterior cervical [Supraclavicular Lymph Nodes Enlarged Bilaterally] : supraclavicular [Femoral Lymph Nodes Enlarged Bilaterally] : femoral [Cervical Lymph Nodes Enlarged Anterior Bilaterally] : anterior cervical, supraclavicular [No Spinal Tenderness] : no spinal tenderness [No Clubbing, Cyanosis] : no clubbing or cyanosis of the fingernails [Involuntary Movements] : no involuntary movements were seen [Normal Color / Pigmentation] : normal skin color and pigmentation [Normal Affect] : the affect was normal [Normal Mood] : the mood was normal [JVD] : there was jugular-venous distention [de-identified] : whitish areas on tongue, dry [de-identified] : +systolic murmur [de-identified] : trace edema of b/l lower legs [de-identified] : action tremor of right hand >left

## 2021-08-17 NOTE — ASSESSMENT
[FreeTextEntry1] : elevated BP today:\par will monitor, also increasing medication for ET of propranolol \par repeat bp in 2 weeks.\par \par \par repeat labwork in oct

## 2021-08-17 NOTE — HISTORY OF PRESENT ILLNESS
[FreeTextEntry1] : 88yoF with pmhx of DM2, COPD, anxiety, stress incontinence, GERD who is seen for follow up visit.\par \par cc worsening Essential tremor\par worse writing, eating, and using computer.\par notes also with head tremor\par denies fatigue or weakness\par currently on propranolol\par \par elevated BP today to 150/90\par denies worse sob or chest pain \par just took morning medications within 10 min\par \par worse dry mouth\par thrush has returned\par uses own nebulizer, \par states ran out of nystatin, needing refill to complete the full course of 7 days- she is self administering.\par \par \par lives at [facility]: on medication management program\par \par nebulizer machine 2x daily that she does herself without problems.  states she is washing mouth afterwards\par \par \par anxiety/depression:; has been on clonazepam x 20 years.\par hx of not tolerating zoloft.\par notes slight increase in anxiety symptoms with reduction of clonazepam, but manageable \par she gave up her car summer 2021 which has been difficult for her\par hx of seeing therapist, declining referral \par \par constipation occasionally, eating bran cereal\par \par DM2: adherent with glipizide and januvia\par \par hypothyroidism:\par adherent with Synthroid\par \par wears hearing aides\par \par hx of Breast cancer 1999 s/p right breast lumpectomy and RT\par hx of lung cancer s/p VATS wedge resection of right middle lobe nodules in 6/2019, left lung cancer s/p left lobectomy 2007.\par \par HCP son Reilly Borja\par \par \par \par

## 2021-08-26 ENCOUNTER — RX RENEWAL (OUTPATIENT)
Age: 86
End: 2021-08-26

## 2021-09-14 ENCOUNTER — NON-APPOINTMENT (OUTPATIENT)
Age: 86
End: 2021-09-14

## 2021-09-14 ENCOUNTER — APPOINTMENT (OUTPATIENT)
Dept: GERIATRICS | Facility: CLINIC | Age: 86
End: 2021-09-14
Payer: MEDICARE

## 2021-09-14 DIAGNOSIS — B37.0 CANDIDAL STOMATITIS: ICD-10-CM

## 2021-09-16 PROBLEM — B37.0 ORAL THRUSH: Status: ACTIVE | Noted: 2021-07-13

## 2021-09-16 RX ORDER — FLUCONAZOLE 100 MG/1
100 TABLET ORAL
Qty: 9 | Refills: 0 | Status: ACTIVE | COMMUNITY
Start: 2021-09-16

## 2021-09-16 NOTE — HISTORY OF PRESENT ILLNESS
[FreeTextEntry1] : 88yoF with pmhx of DM2, COPD, anxiety, stress incontinence, GERD who is seen for follow up visit.\par \par last visit seen for worseing Essential tremor\par worse writing, eating, and using computer.\par notes also with head tremor\par denies fatigue or weakness\par currently on propranolol that was increased from 60 to 120mg daily with improvement in symptoms\par \par elevated BP last visit: today 142/80\par \par worse dry mouth\par thrush has not completed resolved, but she only used the mouth wash twice daily (she is self administering(\par uses own nebulizer, \par \par lives at [facility]: on medication management program\par \par nebulizer machine 2x daily that she does herself without problems. states she is washing mouth afterwards\par \par \par anxiety/depression:; has been on clonazepam x 20 years.\par hx of not tolerating zoloft. continues on paroxetine 20mg \par notes slight increase in anxiety symptoms with reduction of clonazepam, but manageable \par she gave up her car summer 2021 which has been difficult for her\par hx of seeing therapist, declining referral \par \par constipation occasionally, eating bran cereal\par \par DM2: adherent with glipizide and januvia\par \par hypothyroidism:\par adherent with Synthroid\par \par wears hearing aides\par \par hx of Breast cancer 1999 s/p right breast lumpectomy and RT\par hx of lung cancer s/p VATS wedge resection of right middle lobe nodules in 6/2019, left lung cancer s/p left lobectomy 2007.\par - planning for CT dynamic chest this month and f/u with pulmonary\par \par HCP son Reilly Borja\par \par

## 2021-09-16 NOTE — PHYSICAL EXAM
[Alert] : alert [No Acute Distress] : in no acute distress [Sclera] : the sclera and conjunctiva were normal [EOMI] : extraocular movements were intact [Normal Outer Ear/Nose] : the ears and nose were normal in appearance [Normal Appearance] : the appearance of the neck was normal [No Respiratory Distress] : no respiratory distress [No Acc Muscle Use] : no accessory muscle use [Respiration, Rhythm And Depth] : normal respiratory rhythm and effort [No Clubbing, Cyanosis] : no clubbing or cyanosis of the fingernails [de-identified] : thrush [de-identified] : action tremor of right hand>left

## 2021-09-22 ENCOUNTER — APPOINTMENT (OUTPATIENT)
Dept: CT IMAGING | Facility: IMAGING CENTER | Age: 86
End: 2021-09-22
Payer: MEDICARE

## 2021-09-22 ENCOUNTER — OUTPATIENT (OUTPATIENT)
Dept: OUTPATIENT SERVICES | Facility: HOSPITAL | Age: 86
LOS: 1 days | End: 2021-09-22
Payer: MEDICARE

## 2021-09-22 DIAGNOSIS — J39.8 OTHER SPECIFIED DISEASES OF UPPER RESPIRATORY TRACT: ICD-10-CM

## 2021-09-22 DIAGNOSIS — Z90.710 ACQUIRED ABSENCE OF BOTH CERVIX AND UTERUS: Chronic | ICD-10-CM

## 2021-09-22 DIAGNOSIS — Z98.890 OTHER SPECIFIED POSTPROCEDURAL STATES: Chronic | ICD-10-CM

## 2021-09-22 DIAGNOSIS — Z98.49 CATARACT EXTRACTION STATUS, UNSPECIFIED EYE: Chronic | ICD-10-CM

## 2021-09-22 PROCEDURE — 71250 CT THORAX DX C-: CPT

## 2021-09-22 PROCEDURE — 71250 CT THORAX DX C-: CPT | Mod: 26

## 2021-09-26 ENCOUNTER — TRANSCRIPTION ENCOUNTER (OUTPATIENT)
Age: 86
End: 2021-09-26

## 2021-09-27 ENCOUNTER — APPOINTMENT (OUTPATIENT)
Dept: PULMONOLOGY | Facility: CLINIC | Age: 86
End: 2021-09-27
Payer: MEDICARE

## 2021-09-27 VITALS
WEIGHT: 164 LBS | DIASTOLIC BLOOD PRESSURE: 80 MMHG | RESPIRATION RATE: 16 BRPM | TEMPERATURE: 96.9 F | SYSTOLIC BLOOD PRESSURE: 130 MMHG | OXYGEN SATURATION: 94 % | BODY MASS INDEX: 32.2 KG/M2 | HEART RATE: 68 BPM | HEIGHT: 60 IN

## 2021-09-27 DIAGNOSIS — R09.3 ABNORMAL SPUTUM: ICD-10-CM

## 2021-09-27 DIAGNOSIS — K21.9 GASTRO-ESOPHAGEAL REFLUX DISEASE W/OUT ESOPHAGITIS: ICD-10-CM

## 2021-09-27 PROCEDURE — 99214 OFFICE O/P EST MOD 30 MIN: CPT

## 2021-09-27 NOTE — PHYSICAL EXAM
[General Appearance - Well Developed] : well developed [Normal Appearance] : normal appearance [Well Groomed] : well groomed [General Appearance - Well Nourished] : well nourished [No Deformities] : no deformities [General Appearance - In No Acute Distress] : no acute distress [Normal Conjunctiva] : the conjunctiva exhibited no abnormalities [Eyelids - No Xanthelasma] : the eyelids demonstrated no xanthelasmas [Normal Oropharynx] : normal oropharynx [III] : III [Neck Appearance] : the appearance of the neck was normal [Neck Cervical Mass (___cm)] : no neck mass was observed [Jugular Venous Distention Increased] : there was no jugular-venous distention [Heart Rate And Rhythm] : heart rate and rhythm were normal [Heart Sounds] : normal S1 and S2 [Respiration, Rhythm And Depth] : normal respiratory rhythm and effort [Exaggerated Use Of Accessory Muscles For Inspiration] : no accessory muscle use [Auscultation Breath Sounds / Voice Sounds] : lungs were clear to auscultation bilaterally [Abdomen Soft] : soft [Abdomen Tenderness] : non-tender [Abdomen Mass (___ Cm)] : no abdominal mass palpated [Gait - Sufficient For Exercise Testing] : the gait was sufficient for exercise testing [Nail Clubbing] : no clubbing of the fingernails [Cyanosis, Localized] : no localized cyanosis [Skin Color & Pigmentation] : normal skin color and pigmentation [] : no rash [No Venous Stasis] : no venous stasis [No Focal Deficits] : no focal deficits [Oriented To Time, Place, And Person] : oriented to person, place, and time [Impaired Insight] : insight and judgment were intact [Affect] : the affect was normal [FreeTextEntry2] : 1+ lower extremity edema.  [FreeTextEntry1] : emotional about her health; feels frustrated

## 2021-09-27 NOTE — PROCEDURE
[FreeTextEntry1] : EXAM:  CT CHEST\par \par \par PROCEDURE DATE:  09/22/2021\par \par \par \par INTERPRETATION:  CLINICAL INFORMATION: Tracheobronchomalacia\par \par TECHNIQUE:  A volumetric CT acquisition of the chest was obtained from the thoracic inlet to the upper abdomen, without the administration of intravenous contrast including dynamic images,  to assess for tracheobronchomalacia. Coronal and sagittal multiplanar reformations were also submitted.\par \par Comparison: 5/14/2019\par \par FINDINGS:\par \par Lungs/Airways/Pleura: There is upper lobe centrilobular and paraseptal emphysema. Left upper lobectomy and wedge resection in the lateral right middle lobe have been performed. A previous nodular density in the posterior right lower lobe is decreased in size now a much smaller V-shaped mucoid impaction. Worsening tubular mucoid impaction in the superior right lower lobe. There is a new 1.1 cm nodule in the right middle lobe on image 2019 series 3. New adjacent cluster of nodules measuring up to 0.6 cm on image 121. Additional nodules and tubular nodule/mucoid impaction are stable in size since 5/14/2019. There is subpleural reticulation of the right middle lobes compatible with prior breast radiotherapy.\par \par The trachea measures 1.5 cm on inspiration and 0.6 cm on expiration. The left mainstem bronchus measures 1.3 cm on inspiration and 0.3 cm on expiration. The right mainstem bronchus measures 1 cm on inspiration, 0.5 cm on expiration. The bronchus intermedius measures 1 cm on inspiration 0.5 cm on expiration.\par \par No pleural effusion.\par \par Mediastinum/Lymph nodes: No thoracic adenopathy.\par \par Heart and Vessels: The heart is normal in size. The aorta is normal in caliber. There mild aortic valve calcifications. There is mitral annular calcification.\par \par Upper Abdomen: Renal cyst and subcentimeter renal hypodensities are unchanged.\par \par Osseous structures and Soft Tissues: No aggressive bone lesions.\par \par IMPRESSION:\par \par 1.  Findings suggestive of mild tracheobronchomalacia\par \par 2.  New lung nodules measuring up to 1.1 cm in the right middle lobe. While these may be infectious, a repeat chest CT scan is recommended in 6-8 weeks for reassessment.\par \par 3.  Persistent areas of mucoid impaction in both lungs, with areas of improvement and worsening areas described above\par \par 4.  Centrilobular and paraseptal emphysema\par \par --- End of Report ---\par \par \par \par \par \par \par AD MCDOWELL M.D., Attending Radiologist\par This document has been electronically signed. Sep 27 2021  1:44PM\par

## 2021-09-27 NOTE — REVIEW OF SYSTEMS
[Cough] : cough [Sputum] : sputum [Obesity] : obesity [Negative] : Psychiatric [GERD] : no gerd [Chest Discomfort] : no chest discomfort [Diarrhea] : no diarrhea [Constipation] : no constipation [Dysphagia] : no dysphagia

## 2021-09-27 NOTE — HISTORY OF PRESENT ILLNESS
[FreeTextEntry1] : Ms. Borja is a 88 year old female with a history of abnormal chest CT, COPD, lung nodules, OW, poor balance, and snoring presenting to the office today for a follow up visit.\par \par She states she is currently in her normal state overall. \par She reports that she is planning on moving to the [facility] in [facility] this Thursday- she would like to be closer to her family.\par She needs recommendation on pulm to follow with in the area. \par \par She completed a CT scan last week and has not gotten the results. \par She reports she is compliant on her neb treatments, does not take trelegy, is not on the amitriptyline due to her cough being under control. \par \par She feels her cough overall is better than usual- less coughing issues/cough fits. \par Her SOB is mainly with exertion and is baseline for her. \par \par Pt expressed she is very tired of all the medications, the follow ups necessary. She expressed she is tired of feeling scared about her overall health condition. \par She has not followed up with oncologist except for 1x a few years ago. \par \par She denies any chest pain, chest pressure, diarrhea, constipation, dysphagia, dizziness, sour taste in the mouth, heartburn, reflux, myalgias or arthralgias. Reflux is controlled on omeprazole. \par She is currently on fluconazole for thrush. She notes that when she brushes her tongue in the morning, any white on there does go away.\par

## 2021-09-27 NOTE — ASSESSMENT
[FreeTextEntry1] : Ms. Borja is a 87 year old female with a prior history of  breast CA 1999 lung cancer 2007, overweight, diabetes, hyperthyroidism, tremor, anxiety, COPD, diverticulosis, GERD who now comes in for a pulmonary re- evaluation. She is currently stable from pulm perspective. 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 \par -s/p CTS evaluation - Dr. Blair Kline \par -pt needs to follow up with oncologist for opinion- will refer to [facility] for oncologist in [place] to help navigate her issue. Pt was reluctant about evaluation/follow up but eventually agreeable.\par \par Problem 2: COPD / Asthma\par - continue Singulair 10 mg QHS\par - Continue DuoNeb via nebulizer BID\par - Continue Pulmicort 0.5% via nebulizer BID-\par - pt not using Trelegy and would like to hold off on this inhaler. Has been off of this for at least 1 year.  \par \par Problem 3: + for Mild Tracheobronchomalacia\par -Dynamic CT was + for mild TBM\par -discussed Dr. Joel's recommendation to see Dr. Shamar Gan for complete work up/evaluation- pt is absolutely unwilling at this point. \par -pt is not on amitriptyline, feels her cough is under control\par - Tracheomalacia is usually acquired in adults and common causes include damage by tracheostomy or endotracheal intubation damaging the tracheal cartilage with increase risk with multiple intubations, prolonged intubation, and concurrent high dose steroid therapy; external chest wall trauma and surgery; chronic compression of the trachea by benign etiologies (eg, benign mediastinal goiter) or malignancy; relapsing polychondritis; or recurrent infection. Tracheomalacia can be asymptomatic, however signs or symptoms can develop as the severity of the airway narrowing progresses with major symptoms include dyspnea, cough, and sputum retention. Other symptoms include severe paroxysms of coughing, wheezing or stridor, barking cough and may be exacerbated by forced expiration, cough, and valsalva maneuver. Tracheomalacia is diagnosed by a bronchoscopic visualization of dynamic airway collapse on dynamic chest CT. Therapy is warranted in symptomatic patients with severe tracheomalacia and includes surgical repair as tracheobronchoplasty. The patient was referred to Dr. Shamar Gan or Dr. Orlin Carver, at [facility] for a surgical consult.\par \par Problem 4:Mucoid impactions on CT Chest, new 1 cm nodule (likely mucus) hx of AFB + sputum in past s/p ID evaluation\par -discussed the need for new set of sputum AFB cultures- pt is refusing at this time. \par -advised her she will need to start using Acapella religiously- 5-8 times a day 10-15 breaths, will send order to [facility]\par -Follow up CT scan in 2 months to re-assess the 1 cm nodule that is felt to be mucus related\par \par Problem 3: GERD -stable\par -continue Omeprazole 20 mg pre-breakfast\par Things to avoid including overeating, spicy foods, tight clothing, eating within three hours of bed, this list is not all inclusive. \par \par Problem 4: poor balance\par -Recommended balance therapy (Gait Training)\par \par Problem 5: ? OSAS\par -Due to her snoring, elevated Mallampati class, and EDS, she is being recommended for a home sleep study to discern for sleep apnea- pt is not interested at this time/refusing\par Sleep apnea is associated with adverse clinical consequences which an affect most organ systems. Cardiovascular disease risk includes arrhythmias, atrial fibrillation, hypertension, coronary artery disease, and stroke. Metabolic disorders include diabetes type 2, non-alcoholic fatty liver disease. Mood disorder especially depression; and cognitive decline especially in the elderly. Associations with chronic reflux/Sullivan’s esophagus some but not all inclusive. \par \par Problem 6: Overweight \par - Weight loss, exercise, and diet control were discussed and are highly encouraged.\par \par F/U in 3 months with Dr. Joel\par [facility] will contact her about the onc referral\par Office will contact pt about pulm recc in [place].

## 2021-09-28 ENCOUNTER — APPOINTMENT (OUTPATIENT)
Dept: GERIATRICS | Facility: CLINIC | Age: 86
End: 2021-09-28
Payer: MEDICARE

## 2021-09-28 DIAGNOSIS — E03.9 HYPOTHYROIDISM, UNSPECIFIED: ICD-10-CM

## 2021-09-28 DIAGNOSIS — F41.9 ANXIETY DISORDER, UNSPECIFIED: ICD-10-CM

## 2021-09-28 DIAGNOSIS — Z02.2 ENCOUNTER FOR EXAMINATION FOR ADMISSION TO RESIDENTIAL INSTITUTION: ICD-10-CM

## 2021-09-28 DIAGNOSIS — G25.0 ESSENTIAL TREMOR: ICD-10-CM

## 2021-09-29 PROBLEM — G25.0 ESSENTIAL TREMOR: Status: ACTIVE | Noted: 2021-05-18

## 2021-09-29 PROBLEM — Z02.2 ENCOUNTER FOR EXAMINATION FOR ADMISSION TO ASSISTED LIVING FACILITY: Status: ACTIVE | Noted: 2021-09-29

## 2021-09-29 NOTE — HISTORY OF PRESENT ILLNESS
[FreeTextEntry1] : 88yoF with pmhx of DM2, COPD, anxiety, stress incontinence, GERD who is seen needing examination and paperwork completion for moving into new [facility] this week.  \par had PPD placed at urgent care center on 9/26/21 in right arm.\par she is without acute complaints today.\par \par Essential tremor: of hands and head.  difficulty with  writing, eating, and using computer.\par currently on propranolol that was increased from 60 to 120mg daily with improvement in symptoms\par \par bp was elevated in past, but remains controlled.\par \par thrush has resolved on fluconazole.  did not improve previously on nystatin.\par \par she will need to continue  on medication management program\par \par nebulizer machine 2x daily that she does herself without problems. states she is washing mouth afterwards.  \par \par anxiety/depression:; has been on clonazepam x 20 years.\par notes slight increase in anxiety symptoms with reduction of clonazepam, but manageable \par she gave up her car summer 2021 which has been difficult for her\par hx of seeing therapist, declining referral \par \par constipation occasionally, eating bran cereal\par \par DM2: adherent with glipizide and januvia\par \par hypothyroidism:\par adherent with Synthroid\par \par wears hearing aides\par \par hx of Breast cancer 1999 s/p right breast lumpectomy and RT\par hx of lung cancer s/p VATS wedge resection of right middle lobe nodules in 6/2019, left lung cancer s/p left lobectomy 2007.\par - CT dynamic chest that showed mild tracheomalacia, she is declining following up with specialist to further evaluate.\par -recently saw pulmonary this month\par \par HCP son Reilly Borja\par \par

## 2021-09-29 NOTE — REVIEW OF SYSTEMS
[Fever] : no fever [Lower Ext Edema] : no lower extremity edema [As Noted in HPI] : as noted in HPI [Negative] : Heme/Lymph

## 2021-09-29 NOTE — PHYSICAL EXAM
[Alert] : alert [Sclera] : the sclera and conjunctiva were normal [EOMI] : extraocular movements were intact [Normal Outer Ear/Nose] : the ears and nose were normal in appearance [Normal Appearance] : the appearance of the neck was normal [No Respiratory Distress] : no respiratory distress [No Acc Muscle Use] : no accessory muscle use [Respiration, Rhythm And Depth] : normal respiratory rhythm and effort [Normal S1, S2] : normal S1 and S2 [Heart Rate And Rhythm] : heart rate was normal and rhythm regular [Edema] : edema was not present [Abdomen Tenderness] : non-tender [Abdomen Soft] : soft [No Clubbing, Cyanosis] : no clubbing or cyanosis of the fingernails [Normal Color / Pigmentation] : normal skin color and pigmentation [] : no rash [Normal Affect] : the affect was normal [Normal Mood] : the mood was normal [de-identified] : thrush [de-identified] : action tremor of right hand>left

## 2021-10-15 ENCOUNTER — APPOINTMENT (OUTPATIENT)
Dept: PULMONOLOGY | Facility: CLINIC | Age: 86
End: 2021-10-15

## 2021-10-18 ENCOUNTER — RX RENEWAL (OUTPATIENT)
Age: 86
End: 2021-10-18

## 2021-11-03 ENCOUNTER — APPOINTMENT (OUTPATIENT)
Dept: OPHTHALMOLOGY | Facility: CLINIC | Age: 86
End: 2021-11-03

## 2021-11-16 ENCOUNTER — NON-APPOINTMENT (OUTPATIENT)
Age: 86
End: 2021-11-16

## 2021-11-16 ENCOUNTER — APPOINTMENT (OUTPATIENT)
Dept: PULMONOLOGY | Facility: CLINIC | Age: 86
End: 2021-11-16
Payer: MEDICARE

## 2021-11-16 VITALS
HEART RATE: 68 BPM | DIASTOLIC BLOOD PRESSURE: 68 MMHG | OXYGEN SATURATION: 96 % | HEIGHT: 60 IN | BODY MASS INDEX: 32.2 KG/M2 | SYSTOLIC BLOOD PRESSURE: 105 MMHG | WEIGHT: 164 LBS | TEMPERATURE: 97.2 F

## 2021-11-16 PROCEDURE — 99204 OFFICE O/P NEW MOD 45 MIN: CPT

## 2021-11-16 PROCEDURE — 99214 OFFICE O/P EST MOD 30 MIN: CPT

## 2021-11-16 RX ORDER — NYSTATIN 100000 [USP'U]/ML
100000 SUSPENSION ORAL
Qty: 1 | Refills: 3 | Status: DISCONTINUED | COMMUNITY
Start: 2021-07-13 | End: 2021-11-16

## 2021-11-16 RX ORDER — MONTELUKAST 10 MG/1
10 TABLET, FILM COATED ORAL
Qty: 90 | Refills: 1 | Status: DISCONTINUED | COMMUNITY
Start: 2020-02-21 | End: 2021-11-16

## 2021-11-16 RX ORDER — ACETAMINOPHEN 325 MG/1
325 TABLET ORAL
Refills: 0 | Status: DISCONTINUED | COMMUNITY
Start: 2021-06-01 | End: 2021-11-16

## 2021-11-16 RX ORDER — AMITRIPTYLINE HYDROCHLORIDE 10 MG/1
10 TABLET, FILM COATED ORAL
Qty: 60 | Refills: 5 | Status: DISCONTINUED | COMMUNITY
Start: 2020-02-21 | End: 2021-11-16

## 2021-11-16 RX ORDER — ESOMEPRAZOLE MAGNESIUM 40 MG/1
40 CAPSULE, DELAYED RELEASE ORAL
Qty: 90 | Refills: 1 | Status: DISCONTINUED | COMMUNITY
Start: 2021-05-18 | End: 2021-11-16

## 2021-11-16 NOTE — REASON FOR VISIT
[Initial] : an initial visit [Cough] : cough [COPD] : COPD [Shortness of Breath] : shortness of breath [Pulmonary Nodules] : pulmonary nodules [Wheezing] : wheezing [Family Member] : family member [TextBox_44] : assessment. Pt has SOB when she walks, a wet cough with green phlegm and wheezing. Pt had a pulmonologist but moved to new area and needs plan of care.

## 2021-11-16 NOTE — DISCUSSION/SUMMARY
[FreeTextEntry1] : Ms. Borja has an extensive past pulmonary history.  She has COPD and she is currently maintained on budesonide and DuoNeb via the nebulizer and Trelegy once a day.  She is not have any significant symptoms except for chronic mucus production.  This is a product of her chronic bronchitis from a many years of smoking.  At this time I do not think any change in her medication is indicated.  Her most recent CAT scan of the chest in September revealed new right middle lobe 1 cm nodule.  She has a history of a right middle lobe wedge resection and a left upper lobe lobectomy for lung carcinoma.  The recent biopsy of the right middle lobe revealed neuroendocrine tumorlets.  We will obtain be obtaining a follow-up CAT scan now as last one was done 2 months ago to see if is any increase in size of this right middle lobe nodule.  We will then make further decisions based on this CAT scan study.  This was thoroughly explained to the patient and her son and all are in agreement with this plan of care.

## 2021-11-16 NOTE — PHYSICAL EXAM
[No Acute Distress] : no acute distress [Normal Oropharynx] : normal oropharynx [Normal Appearance] : normal appearance [No Neck Mass] : no neck mass [Normal Rate/Rhythm] : normal rate/rhythm [Normal S1, S2] : normal s1, s2 [No Murmurs] : no murmurs [No Resp Distress] : no resp distress [Clear to Auscultation Bilaterally] : clear to auscultation bilaterally [No Abnormalities] : no abnormalities [Benign] : benign [Normal Gait] : normal gait [No Clubbing] : no clubbing [No Cyanosis] : no cyanosis [No Edema] : no edema [FROM] : FROM [Normal Color/ Pigmentation] : normal color/ pigmentation [No Focal Deficits] : no focal deficits [Oriented x3] : oriented x3 [Normal Affect] : normal affect [TextBox_68] : Decreased breath sounds but clear bilaterally

## 2021-11-16 NOTE — HISTORY OF PRESENT ILLNESS
[Former] : former [>= 30 pack years] : >= 30 pack years [Never] : never [TextBox_4] : 88 female quit tobacco  15 yrs ago 1.5 x 45 yrs\par Hx copd for 10 yrs no hosp admissions one ED visit at [facility]\par pt relocated to this area\par today feels good ++cough and phlegm lite green no fever no chest pain no wheeze \par no difficulty walking\par no nite awakening [TextBox_11] : 1.5 [TextBox_13] : 45 [YearQuit] : 2006

## 2021-11-30 ENCOUNTER — NON-APPOINTMENT (OUTPATIENT)
Age: 86
End: 2021-11-30

## 2021-11-30 ENCOUNTER — APPOINTMENT (OUTPATIENT)
Dept: PULMONOLOGY | Facility: CLINIC | Age: 86
End: 2021-11-30
Payer: MEDICARE

## 2021-11-30 VITALS
TEMPERATURE: 97.4 F | HEIGHT: 62 IN | HEART RATE: 85 BPM | DIASTOLIC BLOOD PRESSURE: 67 MMHG | OXYGEN SATURATION: 95 % | WEIGHT: 160 LBS | BODY MASS INDEX: 29.44 KG/M2 | SYSTOLIC BLOOD PRESSURE: 125 MMHG

## 2021-11-30 DIAGNOSIS — C7A.8 OTHER MALIGNANT NEUROENDOCRINE TUMORS: ICD-10-CM

## 2021-11-30 DIAGNOSIS — R05.3 CHRONIC COUGH: ICD-10-CM

## 2021-11-30 DIAGNOSIS — J39.8 OTHER SPECIFIED DISEASES OF UPPER RESPIRATORY TRACT: ICD-10-CM

## 2021-11-30 PROCEDURE — 99214 OFFICE O/P EST MOD 30 MIN: CPT

## 2021-11-30 NOTE — REASON FOR VISIT
[Follow-Up] : a follow-up visit [Cough] : cough [COPD] : COPD [Pulmonary Nodules] : pulmonary nodules [TextBox_44] : CAT scan results.

## 2021-11-30 NOTE — DISCUSSION/SUMMARY
[FreeTextEntry1] : Ms. Borja has multiple pulmonary problems.  She has COPD and is currently well controlled on Trelegy and DuoNeb via the nebulizer.  She is on budesonide via nebulizer as well and we will continue this therapy.  The patient complains of a cough and has a postnasal drip.  We will start Flonase 2 sprays in each nostril every morning.  We will also start Tessalon Perles 1 to 2 pills twice a day for the cough.  I did extensive discussion with the patient and her son regarding the CAT scan of the chest.  It is likely these nodules are neuroendocrine in origin as before.  The patient never saw an oncologist for the initial diagnosis.  The options include a repeat biopsy surgical resection or serial CAT scans.  Because of the patient's age and the hesitancy for any oncology therapy for the initial diagnosis I feel a follow-up CAT scan in 4 months is indicated.  If the area increases in size then a biopsy will be needed.  The patient and her son understand and agree with this plan of care.  The patient was seen in approximately 4 months time check on her status.

## 2021-11-30 NOTE — PROCEDURE
[FreeTextEntry1] : CAT scan of the chest performed on November 24, 2021 compared with September 22, 2021 no change in 1.1 cm right mid lobe pulmonary nodule and no change in adjacent 1 cm nodule.

## 2021-11-30 NOTE — HISTORY OF PRESENT ILLNESS
[Former] : former [Never] : never [TextBox_4] : 88 female  for fu regarding lung nodules\par feels poor ++cough sl dyspnea with activity occ wheeze \par no fever no chest pain no tightness\par no nite awakening [TextBox_11] : 1.5 [TextBox_13] : 5 [YearQuit] : 2006

## 2021-12-07 RX ORDER — FLUTICASONE PROPIONATE 50 UG/1
50 SPRAY, METERED NASAL DAILY
Qty: 1 | Refills: 3 | Status: ACTIVE | COMMUNITY
Start: 2021-11-30 | End: 1900-01-01

## 2021-12-21 ENCOUNTER — APPOINTMENT (OUTPATIENT)
Dept: PULMONOLOGY | Facility: CLINIC | Age: 86
End: 2021-12-21

## 2022-01-10 NOTE — PROCEDURE
Anesthesia Type: 1% lidocaine with epinephrine Wound Check: 7 days Anesthesia Volume In Cc: 3 Use Complex Repair Preambles?: Yes Complex Requirements: Retention Sutures?: No 3 Mm Punch Excision Text: A 3 mm punch biopsy was used to make an initial incision over the lesion.  After this overlying column of skin was removed, blunt dissection was used to free the lesion from the surrounding tissues and the lesion was extirpated through the surgical opening made by the punch biopsy. 6 Mm Punch Excision Text: A 6 mm punch biopsy was used to make an initial incision over the lesion.  After this overlying column of skin was removed, blunt dissection was used to free the lesion from the surrounding tissues and the lesion was extirpated through the surgical opening made by the punch biopsy. Suture Removal: 10 days 7 Mm Punch Excision Text: A 7 mm punch biopsy was used to make an initial incision over the lesion.  After this overlying column of skin was removed, blunt dissection was used to free the lesion from the surrounding tissues and the lesion was extirpated through the surgical opening made by the punch biopsy. Additional Anesthesia Volume In Cc: 2 2.5 Mm Punch Excision Text: A 2.5 mm punch biopsy was used to make an initial incision over the lesion.  After this overlying column of skin was removed, blunt dissection was used to free the lesion from the surrounding tissues and the lesion was extirpated through the surgical opening made by the punch biopsy. Excision Depth: adipose tissue Nostril Rim Text: The closure involved the nostril rim. Wound Care: Petrolatum Estimated Blood Loss (Cc): minimal Undermining Type: Entire Wound Hemostasis: Electrocautery Consent was obtained from the patient. The risks and benefits to therapy were discussed in detail. Specifically, the risks of infection, scarring, bleeding, prolonged wound healing, incomplete removal, allergy to anesthesia, nerve injury and recurrence were addressed. Prior to the procedure, the treatment site was clearly identified and confirmed by the patient. All components of Universal Protocol/PAUSE Rule completed. 3.5 Mm Punch Excision Text: A 3.5 mm punch biopsy was used to make an initial incision over the lesion.  After this overlying column of skin was removed, blunt dissection was used to free the lesion from the surrounding tissues and the lesion was extirpated through the surgical opening made by the punch biopsy. Vermilion Border Text: The closure involved the vermilion border. 5 Mm Punch Excision Text: A 5 mm punch biopsy was used to make an initial incision over the lesion.  After this overlying column of skin was removed, blunt dissection was used to free the lesion from the surrounding tissues and the lesion was extirpated through the surgical opening made by the punch biopsy. Complex Repair Preamble Text (Leave Blank If You Do Not Want): Extensive wide undermining was performed. Intermediate Repair Preamble Text (Leave Blank If You Do Not Want): Undermining was performed with blunt dissection. Detail Level: Detailed 4.5 Mm Punch Excision Text: A 4.5 mm punch biopsy was used to make an initial incision over the lesion.  After this overlying column of skin was removed, blunt dissection was used to free the lesion from the surrounding tissues and the lesion was extirpated through the surgical opening made by the punch biopsy. Retention Suture Text: Retention sutures were placed to support the closure and prevent dehiscence. 1.5 Mm Punch Excision Text: A 1.5 mm punch biopsy was used to make an initial incision over the lesion.  After this overlying column of skin was removed, blunt dissection was used to free the lesion from the surrounding tissues and the lesion was extirpated through the surgical opening made by the punch biopsy. Excision Method: 4 mm Punch Medical Necessity Clause: This procedure was medically necessary because the lesion that was treated was: X Size Of Lesion In Cm (Optional): 0 4 Mm Punch Excision Text: A 4 mm punch biopsy was used to make an initial incision over the lesion.  After this overlying column of skin was removed, blunt dissection was used to free the lesion from the surrounding tissues and the lesion was extirpated through the surgical opening made by the punch biopsy. Helical Rim Text: The closure involved the helical rim. Epidermal Sutures: 5-0 Nylon 12 Mm Punch Excision Text: A 12 mm punch biopsy was used to make an initial incision over the lesion.  After this overlying column of skin was removed, blunt dissection was used to free the lesion from the surrounding tissues and the lesion was extirpated through the surgical opening made by the punch biopsy. Size Of Lesion In Cm: 0.5 Repair Type: None (Simple) Purse String (Intermediate) Text: Given the location of the defect and the characteristics of the surrounding skin a purse string intermediate closure was deemed most appropriate.  Undermining was performed circumfirentially around the surgical defect.  A purse string suture was then placed and tightened. Medical Necessity Clause: The excision was medically necessary because the lesion which was excised was 8 Mm Punch Excision Text: A 8 mm punch biopsy was used to make an initial incision over the lesion.  After this overlying column of skin was removed, blunt dissection was used to free the lesion from the surrounding tissues and the lesion was extirpated through the surgical opening made by the punch biopsy. Debridement Text: The wound edges were debrided prior to proceeding with the closure to facilitate wound healing. [FreeTextEntry1] : Ct Chest (10.8.2020) reveals CT of 6/18/2020 demonstrated an airspace opacity within the superior segment of the left lower lobe which had both a groundglass component and a reticular component. The groungglass component has nearly resolved. The reticular component persists and is unchanged. The remainder of the CT of the chest is without significant change from most recent study of 6/18/2020.  10 Mm Punch Excision Text: A 10 mm punch biopsy was used to make an initial incision over the lesion.  After this overlying column of skin was removed, blunt dissection was used to free the lesion from the surrounding tissues and the lesion was extirpated through the surgical opening made by the punch biopsy. Post-Care Instructions: I reviewed with the patient in detail post-care instructions. Patient is not to engage in any heavy lifting, exercise, or swimming for the next 14 days. Should the patient develop any fevers, chills, bleeding, severe pain patient will contact the office immediately. Billing Type: Third-Party Bill Epidermal Closure: simple interrupted 2 Mm Punch Excision Text: A 2 mm punch biopsy was used to make an initial incision over the lesion.  After this overlying column of skin was removed, blunt dissection was used to free the lesion from the surrounding tissues and the lesion was extirpated through the surgical opening made by the punch biopsy. Dressing: dry sterile dressing

## 2022-02-04 ENCOUNTER — NON-APPOINTMENT (OUTPATIENT)
Age: 87
End: 2022-02-04

## 2022-03-28 ENCOUNTER — NON-APPOINTMENT (OUTPATIENT)
Age: 87
End: 2022-03-28

## 2022-03-29 ENCOUNTER — APPOINTMENT (OUTPATIENT)
Dept: PULMONOLOGY | Facility: CLINIC | Age: 87
End: 2022-03-29

## 2022-04-05 ENCOUNTER — APPOINTMENT (OUTPATIENT)
Dept: PULMONOLOGY | Facility: CLINIC | Age: 87
End: 2022-04-05
Payer: MEDICARE

## 2022-04-05 DIAGNOSIS — R91.8 OTHER NONSPECIFIC ABNORMAL FINDING OF LUNG FIELD: ICD-10-CM

## 2022-04-05 DIAGNOSIS — R93.89 ABNORMAL FINDINGS ON DIAGNOSTIC IMAGING OF OTHER SPECIFIED BODY STRUCTURES: ICD-10-CM

## 2022-04-05 PROCEDURE — 99443: CPT

## 2022-04-05 NOTE — PROCEDURE
[FreeTextEntry1] : CAT scan of chest performed March 21, 2022 reveals no change from September 2021 regarding emphysema left upper lobe lobectomy and stable 1 cm right middle lobe nodule

## 2022-04-05 NOTE — DISCUSSION/SUMMARY
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  A new right middle lobe nodule was seen and after extensive discussion on a prior visit the family decided not to pursue a biopsy and to just watch the nodule.  The patient is elderly and frail and they are proceeding with conservative therapy.  Follow-up CAT scan revealed no change in the nodule.  At this time the decision is to continue serial observation and we will follow-up CAT scan in approximately 6 months.  Patient and her family understand and agree to this plan of care.\par The patient gave verbal consent for this telephonic visit.\par The patient was at their home at the time of the visit\par The visit lasted 27 minutes via phone time.

## 2022-04-05 NOTE — HISTORY OF PRESENT ILLNESS
[TextBox_4] : The patient gave verbal consent for this telephonic visit.\par The patient was at their home at the time of the visit\par The visit lasted 27 minutes via phone time. \par 88-year-old female resident of the [facility] who was to present today for follow-up regarding an abnormal CAT scan.  The patient developed Covid approximately 2 weeks ago.  She is on quarantine at the [facility] and although her symptoms are essentially resolved because there is multiple patients with Covid they are still in quarantine.  Patient is feeling good.  She has no cough chest pain or shortness of breath.  Patient denies any wheezing weight loss or hemoptysis.

## 2022-10-04 ENCOUNTER — APPOINTMENT (OUTPATIENT)
Dept: PULMONOLOGY | Facility: CLINIC | Age: 87
End: 2022-10-04

## 2023-01-09 ENCOUNTER — NON-APPOINTMENT (OUTPATIENT)
Age: 88
End: 2023-01-09

## 2023-01-16 ENCOUNTER — RX RENEWAL (OUTPATIENT)
Age: 88
End: 2023-01-16

## 2023-01-17 ENCOUNTER — APPOINTMENT (OUTPATIENT)
Dept: PULMONOLOGY | Facility: CLINIC | Age: 88
End: 2023-01-17
Payer: MEDICARE

## 2023-01-17 VITALS
TEMPERATURE: 97.3 F | DIASTOLIC BLOOD PRESSURE: 90 MMHG | HEIGHT: 62 IN | WEIGHT: 160 LBS | OXYGEN SATURATION: 95 % | BODY MASS INDEX: 29.44 KG/M2 | HEART RATE: 75 BPM | SYSTOLIC BLOOD PRESSURE: 145 MMHG

## 2023-01-17 DIAGNOSIS — R91.8 OTHER NONSPECIFIC ABNORMAL FINDING OF LUNG FIELD: ICD-10-CM

## 2023-01-17 PROCEDURE — 99214 OFFICE O/P EST MOD 30 MIN: CPT

## 2023-01-17 NOTE — REASON FOR VISIT
[Follow-Up] : a follow-up visit [Cough] : cough [COPD] : COPD [Shortness of Breath] : shortness of breath [Pulmonary Nodules] : pulmonary nodules [Family Member] : family member [TextBox_44] : CT results. pt hasn’t been seen in over a year. Today pt complains of a wet cough and SOB when walking for about a year.

## 2023-01-17 NOTE — HISTORY OF PRESENT ILLNESS
[Former] : former [Never] : never [TextBox_4] : 88 female hx marquez resection for neuroendocrine tumor \par pt with new rml nodule in april 2022 and family decided no bx and will cont to observe\par today feels good except coughing for 1 year no fever  no chest pain\par ++phlegm lite green\par full ambulation [TextBox_11] : 1.5 [TextBox_13] : 50 [YearQuit] : 2006

## 2023-01-17 NOTE — PROCEDURE
[FreeTextEntry1] : CAT scan chest performed [facility] 1/5/2023 9 mm right upper lobe nodule 8 mm right upper lobe nodule 6 mm right lower lobe nodule and other densities right middle lobe and lower lobe.  Status post left upper lobe resection.  No comparison to prior CAT scan done March 2022 because this was done at [facility].

## 2023-01-24 ENCOUNTER — NON-APPOINTMENT (OUTPATIENT)
Age: 88
End: 2023-01-24

## 2023-01-24 RX ORDER — BENZONATATE 100 MG/1
100 CAPSULE ORAL TWICE DAILY
Qty: 60 | Refills: 3 | Status: ACTIVE | COMMUNITY
Start: 2021-11-30 | End: 1900-01-01

## 2023-01-24 RX ORDER — IPRATROPIUM BROMIDE AND ALBUTEROL SULFATE 2.5; .5 MG/3ML; MG/3ML
0.5-2.5 (3) SOLUTION RESPIRATORY (INHALATION)
Qty: 360 | Refills: 3 | Status: ACTIVE | COMMUNITY
Start: 2019-02-04 | End: 1900-01-01

## 2023-01-24 RX ORDER — FLUTICASONE FUROATE, UMECLIDINIUM BROMIDE AND VILANTEROL TRIFENATATE 100; 62.5; 25 UG/1; UG/1; UG/1
100-62.5-25 POWDER RESPIRATORY (INHALATION)
Qty: 90 | Refills: 3 | Status: ACTIVE | COMMUNITY
Start: 2018-07-27 | End: 1900-01-01

## 2023-03-03 NOTE — ADDENDUM
Pt. CALVIN Escobar in postop is having 6/10 pain. Possible to order pain meds? Thanks, Fiorella RESENDIZ [id number]   [FreeTextEntry1] : Documented by Karen Hall acting as a scribe for Dr. Misael Joel on 06/23/2020.\par \par All medical record entries made by the Scribe were at my, Dr. Misael Joel's, direction and personally dictated by me on 06/23/2020. I have reviewed the chart and agree that the record accurately reflects my personal performance of the history, physical exam, assessment and plan. I have also personally directed, reviewed, and agree with the discharge instructions. \par

## 2023-12-15 ENCOUNTER — APPOINTMENT (OUTPATIENT)
Dept: PULMONOLOGY | Facility: CLINIC | Age: 88
End: 2023-12-15
Payer: MEDICARE

## 2023-12-15 VITALS
BODY MASS INDEX: 24.48 KG/M2 | HEART RATE: 76 BPM | WEIGHT: 133 LBS | DIASTOLIC BLOOD PRESSURE: 60 MMHG | HEIGHT: 62 IN | OXYGEN SATURATION: 91 % | SYSTOLIC BLOOD PRESSURE: 112 MMHG

## 2023-12-15 DIAGNOSIS — E11.9 TYPE 2 DIABETES MELLITUS W/OUT COMPLICATIONS: ICD-10-CM

## 2023-12-15 DIAGNOSIS — J44.9 CHRONIC OBSTRUCTIVE PULMONARY DISEASE, UNSPECIFIED: ICD-10-CM

## 2023-12-15 PROCEDURE — 99215 OFFICE O/P EST HI 40 MIN: CPT

## 2023-12-15 RX ORDER — BUDESONIDE 0.5 MG/2ML
0.5 INHALANT ORAL
Qty: 180 | Refills: 1 | Status: DISCONTINUED | COMMUNITY
Start: 2019-10-30 | End: 2023-12-15

## 2023-12-15 RX ORDER — FLUTICASONE PROPIONATE 50 UG/1
50 SPRAY, METERED NASAL DAILY
Qty: 1 | Refills: 6 | Status: ACTIVE | COMMUNITY
Start: 2023-12-15 | End: 1900-01-01

## 2023-12-15 NOTE — HISTORY OF PRESENT ILLNESS
[Former] : former [Never] : never [TextBox_4] : 89 female quit tobacco 15 oli co cough and green phlegm  for  many years  no fever no blood no change with position stopped duoneb  as no improvement and using trelegy only  [TextBox_11] : 1.5 [TextBox_13] : 50 [YearQuit] : 2006

## 2023-12-15 NOTE — DISCUSSION/SUMMARY
[FreeTextEntry1] : Ms. Borja has a chronic cough with chronic mucus production.  She was doing better when she was using the DuoNeb in addition to the Trelegy as this helped clear the phlegm.  She also could have some postnasal drip.  I have asked her to start Flonase 2 sprays in each nostril every morning.  She will resume the DuoNeb 2-3 times a day and I recommend use of Trelegy once in the morning.  The patient had a follow-up CAT scan but the results are not available.  Once we get those results I will review and discussed with patient. The patient understands and agrees with plan of care. Today's office visit encompassed 57 minutes. I conducted an extensive history,physical exam and reviewed diagnosis and treatment options including diagnostic tests,radiology studies including cat scans and the use of prescription medication.

## 2023-12-15 NOTE — REASON FOR VISIT
[Follow-Up] : a follow-up visit [Cough] : cough [COPD] : COPD [Shortness of Breath] : shortness of breath [Pulmonary Nodules] : pulmonary nodules [Wheezing] : wheezing [Family Member] : family member [TextBox_44] : Pt hasn't been seen since Jan 2023. CT done at [facility]. Today pt complains of a very productive cough the presents lime green phlegm. Pt also reports increasing SOB. Pt also states that she has stopped using Ipratropium bromide and albuterol nebulizer solution because she felt like it wasn't helping.

## 2023-12-19 DIAGNOSIS — J18.9 PNEUMONIA, UNSPECIFIED ORGANISM: ICD-10-CM

## 2023-12-19 RX ORDER — AMOXICILLIN AND CLAVULANATE POTASSIUM 875; 125 MG/1; MG/1
875-125 TABLET, COATED ORAL
Qty: 14 | Refills: 1 | Status: ACTIVE | COMMUNITY
Start: 2023-12-19 | End: 1900-01-01

## 2023-12-20 ENCOUNTER — NON-APPOINTMENT (OUTPATIENT)
Age: 88
End: 2023-12-20

## 2023-12-26 ENCOUNTER — NON-APPOINTMENT (OUTPATIENT)
Age: 88
End: 2023-12-26

## 2024-01-01 ENCOUNTER — NON-APPOINTMENT (OUTPATIENT)
Age: 89
End: 2024-01-01

## 2024-02-20 ENCOUNTER — APPOINTMENT (OUTPATIENT)
Dept: PULMONOLOGY | Facility: CLINIC | Age: 89
End: 2024-02-20

## 2024-05-31 NOTE — PATIENT PROFILE ADULT - NSTRANSFERBELONGINGSRESP_GEN_A_NUR
[FreeTextEntry1] : Assessment: 1.  L LE DVT in 11/2023 2.  PE - bilateral 3.  Unprovoked   Plan 1.  CT venogram negative for central venous compression.  2.  Repeat LE venous duplex with improved thrombus burden, in April , still with chronic DVT.   3. Appreciate Hematology.  4. Continue Eliquis 5mg BID. 5. Compression garments and leg elevation. 6. Return in November - will send dimer and duplex at that time, if ok will consider dose reduction of eliquis. paried with repeat testing afterwards   * Rakel's   yes

## 2024-06-18 NOTE — H&P PST ADULT - BLOOD AVOIDANCE/RESTRICTIONS, PROFILE
[facility]ESSIONAL [facility]  [facility]  [address] [id number]  Dept: [phone]  Dept Fax: [phone]  Loc: [phone]  PROGRESS NOTE      VisitDate: 6/18/2024    Gely Archer is a 65 y.o. female who presents today for:     Chief Complaint   Patient presents with    1 Month Follow-Up     Blood pressure check.    Discuss Medications     Would like to try to get back on esomeprazole 40 mg BID. The omeprazole not helping her.          Subjective:  1 month follow-up for hypertension.  Patient reports doing well.  Denies purchase several air purifier's for her apartment with good results.  Requesting to substitute Prilosec for Nexium.  Has taken multiple PPIs without benefit.  Has taken Nexium in the past with good results.  Medical history reviewed.  Here to review recent labs      Component      Latest Ref Rng 6/13/2024   WBC      4.8 - 10.8 thou/mm3 6.1    RBC      4.20 - 5.40 mill/mm3 4.46    Hemoglobin Quant      12.0 - 16.0 gm/dl 12.7    Hematocrit      37.0 - 47.0 % 40.6    MCV      81.0 - 99.0 fL 91.0    MCH      26.0 - 33.0 pg 28.5    MCHC      32.2 - 35.5 gm/dl 31.3 (L)    RDW-CV      11.5 - 14.5 % 13.7    RDW-SD      35.0 - 45.0 fL 45.8 (H)    Platelet Count      130 - 400 thou/mm3 314    MPV      9.4 - 12.4 fL 10.2    Seg Neutrophils      % 61.3    Lymphocytes      % 29.8    Monocytes %      % 4.6    Eosinophils      % 3.0    Basophils      % 1.0    Immature Granulocytes %      % 0.3    Neutrophils Absolute      1.8 - 7.7 thou/mm3 3.7    Lymphocytes Absolute      1.0 - 4.8 thou/mm3 1.8    Monocytes Absolute      0.4 - 1.3 thou/mm3 0.3 (L)    Eosinophils Absolute      0.0 - 0.4 thou/mm3 0.2    Basophils Absolute      0.0 - 0.1 thou/mm3 0.1    Immature Grans (Abs)      0.00 - 0.07 thou/mm3 0.02    Nucleated Red Blood Cells      /100 wbc 0    Glucose      70 - 108 mg/dL 103    Creatinine      0.4 - 1.2 mg/dL 0.6    BUN,BUNPL      7 - 22 mg/dL 10  
none

## 2024-07-10 NOTE — DISCUSSION/SUMMARY
[FreeTextEntry1] : Ms Borja has a history of a left upper lobe resection for neuroendocrine tumor.  Patient denies all symptoms.  Follow-up CAT scan shows multiple nodules.  I reviewed it and although the nodules are present that does not seem to be any tremendous increase in size from the prior.  I had a long discussion with the patient and her son and the neck step is to obtain the [place] CAT scan disc from March 2020 to bring it to  and let them come directly comparison and issue an addendum.  The son will do this.  Once this is done I will call the son and the patient and will make further decisions.  Since at this point the decision is no further invasive therapy follow-up CAT scan might not be necessary.  We will discuss this further when the other reports are available.\par The patient understands and agrees with plan of care.\par Today's office visit encompassed 32 minutes. I conducted an extensive history ,physical exam and reviewed diagnosis and treatment options  including diagnostic tests,radiologic studies including  cat scans  and the use of prescription medication. 
normal

## 2024-07-29 NOTE — CONSULT NOTE ADULT - PROBLEM SELECTOR PROBLEM 2
"  Caller: No Santos \"Francesca\"    Relationship to patient: Self    Best call back number: [phone] (home)       Patient is needing: PT MISSED APPT TODAY. SHE IS NOW RESCHEDULED FOR 10/24        "
Hypertension

## 2025-01-13 ENCOUNTER — APPOINTMENT (OUTPATIENT)
Dept: PULMONOLOGY | Facility: CLINIC | Age: 89
End: 2025-01-13
Payer: MEDICARE

## 2025-01-13 VITALS — HEART RATE: 69 BPM | OXYGEN SATURATION: 96 % | WEIGHT: 130 LBS | BODY MASS INDEX: 23.92 KG/M2 | HEIGHT: 62 IN

## 2025-01-13 DIAGNOSIS — J44.1 CHRONIC OBSTRUCTIVE PULMONARY DISEASE WITH (ACUTE) EXACERBATION: ICD-10-CM

## 2025-01-13 DIAGNOSIS — J18.9 PNEUMONIA, UNSPECIFIED ORGANISM: ICD-10-CM

## 2025-01-13 PROCEDURE — 99495 TRANSJ CARE MGMT MOD F2F 14D: CPT

## 2025-01-13 RX ORDER — CEFUROXIME AXETIL 500 MG/1
500 TABLET, FILM COATED ORAL
Qty: 20 | Refills: 0 | Status: ACTIVE | COMMUNITY
Start: 2025-01-13 | End: 1900-01-01

## 2025-01-13 RX ORDER — PREDNISONE 10 MG/1
10 TABLET ORAL
Qty: 120 | Refills: 1 | Status: ACTIVE | COMMUNITY
Start: 2025-01-13 | End: 1900-01-01

## 2025-01-14 ENCOUNTER — NON-APPOINTMENT (OUTPATIENT)
Age: 89
End: 2025-01-14

## 2025-01-27 ENCOUNTER — APPOINTMENT (OUTPATIENT)
Dept: PULMONOLOGY | Facility: CLINIC | Age: 89
End: 2025-01-27
Payer: MEDICARE

## 2025-01-27 VITALS
HEART RATE: 77 BPM | TEMPERATURE: 98 F | SYSTOLIC BLOOD PRESSURE: 102 MMHG | HEIGHT: 62 IN | BODY MASS INDEX: 23.92 KG/M2 | OXYGEN SATURATION: 98 % | WEIGHT: 130 LBS | DIASTOLIC BLOOD PRESSURE: 70 MMHG

## 2025-01-27 DIAGNOSIS — J18.9 PNEUMONIA, UNSPECIFIED ORGANISM: ICD-10-CM

## 2025-01-27 DIAGNOSIS — J44.9 CHRONIC OBSTRUCTIVE PULMONARY DISEASE, UNSPECIFIED: ICD-10-CM

## 2025-01-27 PROCEDURE — 99214 OFFICE O/P EST MOD 30 MIN: CPT

## 2025-03-31 ENCOUNTER — APPOINTMENT (OUTPATIENT)
Dept: PULMONOLOGY | Facility: CLINIC | Age: 89
End: 2025-03-31